# Patient Record
Sex: FEMALE | Race: ASIAN | NOT HISPANIC OR LATINO | Employment: FULL TIME | URBAN - METROPOLITAN AREA
[De-identification: names, ages, dates, MRNs, and addresses within clinical notes are randomized per-mention and may not be internally consistent; named-entity substitution may affect disease eponyms.]

---

## 2021-03-10 ENCOUNTER — INITIAL PRENATAL (OUTPATIENT)
Dept: OBSTETRICS AND GYNECOLOGY | Facility: CLINIC | Age: 26
End: 2021-03-10

## 2021-03-10 VITALS
SYSTOLIC BLOOD PRESSURE: 98 MMHG | DIASTOLIC BLOOD PRESSURE: 61 MMHG | WEIGHT: 110 LBS | BODY MASS INDEX: 20.24 KG/M2 | HEIGHT: 62 IN

## 2021-03-10 DIAGNOSIS — O21.9 NAUSEA AND VOMITING OF PREGNANCY, ANTEPARTUM: ICD-10-CM

## 2021-03-10 DIAGNOSIS — Z12.4 CERVICAL CANCER SCREENING: ICD-10-CM

## 2021-03-10 DIAGNOSIS — Z34.90 PREGNANCY, UNSPECIFIED GESTATIONAL AGE: Primary | ICD-10-CM

## 2021-03-10 LAB
B-HCG UR QL: POSITIVE
INTERNAL NEGATIVE CONTROL: NEGATIVE
INTERNAL POSITIVE CONTROL: POSITIVE
Lab: ABNORMAL

## 2021-03-10 PROCEDURE — 81025 URINE PREGNANCY TEST: CPT | Performed by: STUDENT IN AN ORGANIZED HEALTH CARE EDUCATION/TRAINING PROGRAM

## 2021-03-10 PROCEDURE — 0501F PRENATAL FLOW SHEET: CPT | Performed by: STUDENT IN AN ORGANIZED HEALTH CARE EDUCATION/TRAINING PROGRAM

## 2021-03-10 NOTE — PATIENT INSTRUCTIONS
Travel During Pregnancy:  • Always use seatbelts.  A lap belt should be worn below the abdomen (across the hips) and the shoulder belt should be worn across the center of your chest (between the breasts) away from your neck.  Do not put the shoulder belt under your arm or behind your back.  Pull any slack out of the belt.  • Air travel is safe in most uncomplicated pregnancies, but we do not recommend air travel past 36 weeks.  Airlines may also have restrictions, so check with your airline before flying.  For some international flights, the travel cut off may be as early as 28 weeks gestation, and some airlines may require letters from your physician.  • When going on long trips in car, plane, train, or bus, frequent ambulation is important to prevent blood clots in legs and/or lungs.  The following may help with prevention of blood clots in legs: Drink lots of fluid, wear loose-fitting clothing, walk and stretch at regular intervals.    • Avoid areas with Zika outbreaks.  For the latest information, you may visit: www.cdc.gov/travel/notices/.  Resources: , , Committee Opinion 455  Nutrition during pregnancy  • Average weight gain during pregnancy is based on your pre-pregnancy body mass index (BMI).  See below for recommended weight gain:  o Underweight (BMI <18.5), we recommend 28 to 40lb weight gain  o Normal weight (BMI 18.5 to 24.9), we recommend a 25 to 35lb weight gain  o Overweight (BMI 25-29.9), we recommend a 15 to 25lb weight gain  o Obese (BMI >30), we recommend keeping weight gain under 20 lbs.    • You should speak with your physician regarding your specific weight goals for this pregnancy.   • Foods to avoid include:   o Fish: avoid certain types of fish such as shark, swordfish, kip mackerel, tilefish.  Limit white (albacore) tuna to 6 oz per week.  Choose fish and shellfish such as shrimp, salmon, catfish, [place].  o Food-borne illness: Pregnant women are much more likely to get  Listeriosis than non-pregnant women.  To help prevent this, avoid eating unpasteurized milk and unpasteurized milk products, hot dogs/lunch meat/cold cuts unless they are heated until steaming right before serving, refrigerated meat spreads, refrigerated smoked seafood, raw or undercooked seafood/eggs/meat.   • Vitamin D helps development of the baby’s bones and teeth.  Good sources of Vitamin D include milk fortified with Vitamin D and fatty fish such as salmon.    • Folic acid, also known as folate, helps develop the baby’s brain and spine.  You should make sure your vitamin contains extra folic acid - at least 400mcg.    • Iron helps make red blood cells.  You need to make extra red blood cell sin pregnancy.  We recommend eating Iron-rich foods such as lean red meat, poultry, fish, dried beans and peas, iron-fortified cereals, and prune juice.  You may be recommended an iron supplement.  If so, it is absorbed more easily if taken with vitamin C-rich foods such as citrus fruits or tomatoes.    • It is important to eat a well balanced diet.  A good recourse for nutrition recommendations is: www.choosemyplate.gov.  • Limit caffeine intake to 200mg daily.  Some coffees/teas/sodas have very different levels of caffeine per serving, so check the nutrition labeling.   Resources: , Committee Opinion 548  Exercise during pregnancy  • If you are healthy and your pregnancy is normal, it is safe to continue or start most types of exercise.   Physical activity does not increase your risk of miscarriage, low birth weight or early delivery, but you should discuss specific limitations or any complications with your physician.    • Benefits of exercise during pregnancy include: reduced back pain, less constipation, promotes overall health and healthy weight gain which may decrease risks for certain pregnancy complications such as diabetes and/or preeclampsia.  • The CDC recommends 150 minutes of moderate intensity aerobic  exercise per week.  Moderate intensity means that you are moving enough to raise your heart rate and start sweating, but you can talk normally.  Brisk walking, swimming/water workouts, modified yoga/pilates, and use of elliptical machines and/or stationary bikes are examples of aerobic activity.    • Precautions:  o Stay hydrated.  Drink plenty of water before, during and after your workout  o Wear supportive clothing such as a sports bra  o Do not become overheated.  Do not exercise outside when it is very hot or humid  o Avoid lying flat on your back as much as possible  o AVOID contact sports, skydiving, sports that risk falling (such as skiing, surfing, off-road cycling, gymnastics, horseback riding), hot yoga/hot pilates, scuba diving  • Stop exercising if you experience: bleeding from the vagina, feeling dizzy/faint, shortness of breath before starting exercise, chest pain, headache, muscle weakness, calf pain or swelling, regular uterine contractions, fluid leaking from the vagina.    • Postpartum exercise: Continuing exercise after you deliver your baby will help boost your energy, strengthen muscles, promote better sleep, and relieve stress.  It also may be useful in preventing postpartum depression.  150 minutes of moderate-intensity aerobic activity is recommended.  Types of exercise and when you can start a regular exercise routine may be limited by the type of delivery you had.  Please discuss with your physician prior to resuming or starting exercise.    Resources: ,   Back pain during pregnancy  • Back pain is very common during pregnancy.  It may arise due to strain on your back muscles, weakness of the abdominal muscles, and pregnancy hormones.    • To prevent back pain:  o Wear shoes with good support. Flat shoes and high heels may not have good arch support.  o Consider a firm mattress  o Use good lifting practices.  Do not bend from the waist to pick things up.  Squat down and bend  "your knees, keeping a straight back.  o Sit in chairs with good back support or use a small pillow behind your lower back.    o Sleep on your side with one or two pillows between your legs  • To ease back pain:   o Exercise can help stretch strained muscles and strengthen weak muscles to promote good posture  • Contact your health care provider with severe pain, pain that persists for more than 2 weeks, fever, burning during urination, or vaginal bleeding.  Resources:     B6/Doxylamine  As a single agent, the recommended dose of pyridoxine (also known as B6) is 10 to 25 mg orally every six to eight hours; the maximum treatment dose suggested for pregnant women is 200 mg/day.  In the [place], doxylamine is available in some over-the-counter sleeping pills (eg, Unisom Sleep Tabs) and as a prescription antihistamine chewable tablet (Aldex AN). One-half of the 25 mg over-the-counter tablet or two chewable 5 mg tablets can be used off-label as an antiemetic; pyridoxine 25 mg three to four times per day should be prescribed with it. The 10 mg dose of pyridoxine is not commercially available in the [place].    Other Information:  The [employer] has released a smart phone application that can help you monitor and manage your symptoms.  The Application is \"Managing NVP,\" and has a green icon that says \"APGO WELLMOM.\"        "

## 2021-03-10 NOTE — PROGRESS NOTES
Initial OB Visit    Chief Complaint   Patient presents with   • Initial Prenatal Visit        Beata Damico is being seen today for her first obstetrical visit.  She is a 25 y.o.    6w6d gestation by LMP 21.     FOB: Jayson Myrick   This is a planned pregnancy.   OB History    Para Term  AB Living   1             SAB TAB Ectopic Molar Multiple Live Births                    # Outcome Date GA Lbr Salvador/2nd Weight Sex Delivery Anes PTL Lv   1 Current                Current obstetric complaints: She reports having morning sickness and feeling tired. Denies vaginal bleeding or abdominal cramping.   Prior obstetric issues, potential pregnancy concerns: n/a  Family history of genetic issues (includes FOB): denies   Prior infections concerning in pregnancy (Rash, fever since LMP): denies   Varicella Hx: unsure if she has receive vaccine.   Prior genetic testing: denies  History of abnormal pap smears: Yes, 4 years ago had an abnormal pap smear. She received HPV vaccine and then had a normal pap smear a year later.   History of STIs: denies History of HSV in self or partner? Denies   Prepregnancy weight: 110 lbs     Past Medical History:   Diagnosis Date   • Abnormal Pap smear of cervix    • Urinary tract infection        History reviewed. No pertinent surgical history.    No current outpatient medications on file.    No Known Allergies    Social History     Socioeconomic History   • Marital status:      Spouse name: Not on file   • Number of children: Not on file   • Years of education: Not on file   • Highest education level: Not on file   Tobacco Use   • Smoking status: Never Smoker   Substance and Sexual Activity   • Alcohol use: Not Currently   • Drug use: Not Currently   • Sexual activity: Yes       History reviewed. No pertinent family history.    Review of systems     Constitutional: negative for chills, fevers and positive for fatigue  Eyes: negative  Ears, nose, mouth, throat, and  face: negative for hearing loss and nasal congestion  Respiratory: negative for asthma and wheezing  Cardiovascular: negative for chest pain and dyspnea  Gastrointestinal: negative for dyspepsia, dysphagia abdominal pain  Genitourinary:negative for urinary incontinence  Integument/breast: negative for breast lump  Hematologic/lymphatic: negative for bleeding  Musculoskeletal:negative for aches  Neurological: negative for numbness/tingling  Behavioral/Psych: negative for anhedonia  Allergic/Immunologic: negative for rash, allergy     Objective    LMP 01/21/2021       General Appearance:    Alert, cooperative, in no acute distress   Head:    Normocephalic, without obvious abnormality, atraumatic   Eyes:            Lids and lashes normal, conjunctivae and sclerae normal, no   icterus, no pallor, corneas clear   Ears:    Ears appear intact with no abnormalities noted   Neck:   No adenopathy, supple, trachea midline   Back:     No kyphosis present, no scoliosis present,                       Lungs:     No increased work of breathing, regular respirations     Heart:    Regular rhythm and normal rate   Breast Exam:    No masses, No nipple discharge   Abdomen:     Normal bowel sounds, no masses, no organomegaly, soft        non-tender, non-distended, no guarding, no rebound                 tenderness   Genitalia:    Vulva - BUS-WNL, NEFG    Vagina - No discharge, No bleeding    Cervix - No Lesions, closed     Uterus - Consistent with 6 weeks    Adnexa - No masses, NT    Pelvimetry - clinically adequate, gynecoid pelvis     Extremities:   Moves all extremities well, no edema, no cyanosis, no              redness   Pulses:   Pulses palpable and equal bilaterally   Skin:   No bleeding, bruising or rash   Lymph nodes:   No palpable adenopathy   Neurologic:   Sensation intact, A&O times 3      Assessment  1. Pregnancy at 6w6d by LMP  2. Nausea and vomiting of pregnancy  3. Cervical cancer screening     Plan    Initial labs  drawn, GC/CHL screen done.  Dating ultrasound ordered today.   Pap smear collected for cervical cancer screening today.   Patient is on Prenatal vitamins  Problem list reviewed and updated.  Reviewed routine prenatal care with the office to include but not limited to:    not to changing cat litter, food restrictions, avoidance of alcohol, tobacco and drugs and saunas/hot tubs, anticipated weight gain/nutrition requirements.  Reviewed nature of practice and hospital.  Reviewed recommended follow up, importance of compliance with care. We reviewed testing in pregnancy including HIV testing and urine drug screen.    Reviewed aneuploidy screening and CF/SMA screening.  We reviewed limitations of testing, possibility of false positive/negative results, possible need for other tests as indicated. CF/SMA testing ordered today.   For contraception after this pregnancy, she is unsure at this time.   Counseled on limitations of ultrasound in pregnancy in detecting aneuploidy/fetal anomalies    We reviewed that at this time, her BMI is classified as BMI 18.5-24.9       Classification: normal weight.  We reviewed that in pregnancy, her recommended weight gain is 25-35 lbs.  In the first trimester, caloric demand is typically not increased.  In the second and third trimester, the increased demand is approximately 350 and 450 calories respectively.    We discussed that first line treatment of nausea and vomiting in pregnancy is vitamin B6/Unisom. Instructions for dosing providing in AVS. We discussed antiemetics can be used if she does not improve with first line therapy.     All questions answered.   Return in about 4 weeks (around 4/7/2021) for prenatal visit .      Paz Ortega MD

## 2021-03-11 LAB
AMPHETAMINES UR QL SCN: NEGATIVE NG/ML
BARBITURATES UR QL SCN: NEGATIVE NG/ML
BENZODIAZ UR QL SCN: NEGATIVE NG/ML
BZE UR QL SCN: NEGATIVE NG/ML
CANNABINOIDS UR QL SCN: NEGATIVE NG/ML
CREAT UR-MCNC: 25 MG/DL (ref 20–300)
LABORATORY COMMENT REPORT: NORMAL
METHADONE UR QL SCN: NEGATIVE NG/ML
OPIATES UR QL SCN: NEGATIVE NG/ML
OXYCODONE+OXYMORPHONE UR QL SCN: NEGATIVE NG/ML
PCP UR QL: NEGATIVE NG/ML
PH UR: 6.8 [PH] (ref 4.5–8.9)
PROPOXYPH UR QL SCN: NEGATIVE NG/ML
VZV IGG SER IA-ACNC: 137 INDEX

## 2021-03-12 LAB
BACTERIA UR CULT: NO GROWTH
BACTERIA UR CULT: NORMAL
C TRACH RRNA CVX QL NAA+PROBE: NEGATIVE
CONV .: NORMAL
CYTOLOGIST CVX/VAG CYTO: NORMAL
CYTOLOGY CVX/VAG DOC CYTO: NORMAL
CYTOLOGY CVX/VAG DOC THIN PREP: NORMAL
DX ICD CODE: NORMAL
HIV 1 & 2 AB SER-IMP: NORMAL
N GONORRHOEA RRNA CVX QL NAA+PROBE: NEGATIVE
OTHER STN SPEC: NORMAL
STAT OF ADQ CVX/VAG CYTO-IMP: NORMAL

## 2021-03-15 ENCOUNTER — TELEPHONE (OUTPATIENT)
Dept: OBSTETRICS AND GYNECOLOGY | Facility: CLINIC | Age: 26
End: 2021-03-15

## 2021-03-29 LAB
CLINICAL INFO: NORMAL
ETHNIC BACKGROUND STATED: NORMAL
GENE MUT TESTED BLD/T: NORMAL
GENETIC COUNSELOR:: NORMAL
LAB DIRECTOR NAME PROVIDER: NORMAL
MOL DX INTERP BLD/T QL: NORMAL
REASON FOR REFERRAL (NARRATIVE): NORMAL
REF LAB TEST METHOD: NORMAL
SERVICE CMNT 02-IMP: NORMAL
SPECIMEN SOURCE: NORMAL
TEST PERFORMANCE INFO SPEC: NORMAL

## 2021-04-07 ENCOUNTER — ROUTINE PRENATAL (OUTPATIENT)
Dept: OBSTETRICS AND GYNECOLOGY | Facility: CLINIC | Age: 26
End: 2021-04-07

## 2021-04-07 VITALS — WEIGHT: 111 LBS | DIASTOLIC BLOOD PRESSURE: 64 MMHG | SYSTOLIC BLOOD PRESSURE: 105 MMHG | BODY MASS INDEX: 20.3 KG/M2

## 2021-04-07 DIAGNOSIS — O21.9 NAUSEA AND VOMITING OF PREGNANCY, ANTEPARTUM: ICD-10-CM

## 2021-04-07 DIAGNOSIS — Z28.39 MATERNAL VARICELLA, NON-IMMUNE: ICD-10-CM

## 2021-04-07 DIAGNOSIS — O09.899 MATERNAL VARICELLA, NON-IMMUNE: ICD-10-CM

## 2021-04-07 DIAGNOSIS — Z3A.10 10 WEEKS GESTATION OF PREGNANCY: Primary | ICD-10-CM

## 2021-04-07 LAB
GLUCOSE UR STRIP-MCNC: NEGATIVE MG/DL
PROT UR STRIP-MCNC: ABNORMAL MG/DL

## 2021-04-07 PROCEDURE — 0502F SUBSEQUENT PRENATAL CARE: CPT | Performed by: STUDENT IN AN ORGANIZED HEALTH CARE EDUCATION/TRAINING PROGRAM

## 2021-04-07 NOTE — PROGRESS NOTES
Chief Complaint   Patient presents with   • Routine Prenatal Visit      Beata Damico is a 26 y.o.  at 10w6d who presents for routine prenatal visit. She reports that she still has nausea and vomiting depending on what she eats and how much she eats. She is able to tolerate PO. She denies vaginal bleeding or abdominal cramping.   /64   Wt 50.3 kg (111 lb)   LMP 2021   BMI 20.30 kg/m²    Gen: well appearing, NAD   Abd: gravid, nontender  See OB Flowsheet    ASSESSMENT:   1. IUP at 10w6d   2. Nausea and vomiting of pregnancy- mild and manageable without medication   3. Varicella non-immune     PLAN:  See updated Problem List   We reviewed aneuploidy screening with cell free DNA via [id number] testing and the patient would like to wait at this time. Reviewed possibility of false positive/negatives associated with results.  Reviewed varicella non-immune status and recommended vaccination after pregnancy. Avoidance of individual with chicken pox or shingles.  Patient to complete OB panel with HIV today.  First trimester bleeding/pain precautions reviewed.  Return in about 4 weeks (around 2021) for prenatal visit .    Paz Ortega MD

## 2021-04-16 ENCOUNTER — BULK ORDERING (OUTPATIENT)
Dept: CASE MANAGEMENT | Facility: OTHER | Age: 26
End: 2021-04-16

## 2021-04-16 DIAGNOSIS — Z23 IMMUNIZATION DUE: ICD-10-CM

## 2021-05-04 ENCOUNTER — ROUTINE PRENATAL (OUTPATIENT)
Dept: OBSTETRICS AND GYNECOLOGY | Facility: CLINIC | Age: 26
End: 2021-05-04

## 2021-05-04 VITALS — BODY MASS INDEX: 20.85 KG/M2 | DIASTOLIC BLOOD PRESSURE: 57 MMHG | SYSTOLIC BLOOD PRESSURE: 110 MMHG | WEIGHT: 114 LBS

## 2021-05-04 DIAGNOSIS — Z34.00 SUPERVISION OF NORMAL FIRST PREGNANCY, ANTEPARTUM: ICD-10-CM

## 2021-05-04 DIAGNOSIS — Z3A.14 14 WEEKS GESTATION OF PREGNANCY: Primary | ICD-10-CM

## 2021-05-04 LAB
GLUCOSE UR STRIP-MCNC: NEGATIVE MG/DL
PROT UR STRIP-MCNC: NEGATIVE MG/DL

## 2021-05-04 PROCEDURE — 0502F SUBSEQUENT PRENATAL CARE: CPT | Performed by: STUDENT IN AN ORGANIZED HEALTH CARE EDUCATION/TRAINING PROGRAM

## 2021-05-04 NOTE — PROGRESS NOTES
Chief Complaint   Patient presents with   • Routine Prenatal Visit      Beata Damico is a 26 y.o.  at 14w5d who presents for routine prenatal visit. She reports doing well and has no complaints. She denies vaginal bleeding, cramping, contractions, LOF. She reports that she might have felt fetal movement but is unsure. Nausea and vomiting have significantly improved as pregnancy has progressed.   .   /57   Wt 51.7 kg (114 lb)   LMP 2021   BMI 20.85 kg/m²    Gen: well appearing, NAD   Abd: gravid, nontender  See OB Flowsheet    ASSESSMENT:   1. IUP at 14w5d   2. Varicella non-immune     PLAN:  Problem list reviewed and updated.  Declines aneuploidy screening at this time.   We discussed anatomy scan at next visit.   Second trimester precautions reviewed including  labor precautions, anticipated fetal movements.   Return in about 4 weeks (around 2021) for prenatal and anatomy ultrasound .    Patient Active Problem List    Diagnosis Date Noted   • Maternal varicella, non-immune 2021   • Pregnancy 03/10/2021       Orders Placed This Encounter   Procedures   • POC Urinalysis Dipstick     This is an external result entered through the Results Console.     Order Specific Question:   Release to patient     Answer:   Immediate     Paz Ortega MD

## 2021-06-01 ENCOUNTER — ROUTINE PRENATAL (OUTPATIENT)
Dept: OBSTETRICS AND GYNECOLOGY | Facility: CLINIC | Age: 26
End: 2021-06-01

## 2021-06-01 VITALS — BODY MASS INDEX: 21.58 KG/M2 | SYSTOLIC BLOOD PRESSURE: 102 MMHG | DIASTOLIC BLOOD PRESSURE: 64 MMHG | WEIGHT: 118 LBS

## 2021-06-01 DIAGNOSIS — Z3A.18 18 WEEKS GESTATION OF PREGNANCY: Primary | ICD-10-CM

## 2021-06-01 LAB
GLUCOSE UR STRIP-MCNC: NEGATIVE MG/DL
PROT UR STRIP-MCNC: NEGATIVE MG/DL

## 2021-06-01 PROCEDURE — 0502F SUBSEQUENT PRENATAL CARE: CPT | Performed by: STUDENT IN AN ORGANIZED HEALTH CARE EDUCATION/TRAINING PROGRAM

## 2021-06-01 NOTE — PROGRESS NOTES
CC: Routine prenatal visit     Beata Damico is a 26 y.o.  at 18w5d who presents for routine prenatal visit. She reports doing well and is starting to feel better. Denies vaginal bleeding, cramping, contractions, LOF. She is unsure if she has felt fetal movement. She reports that her maternal grandmother  during childbirth of her mother.     /64   Wt 53.5 kg (118 lb)   LMP 2021   BMI 21.58 kg/m²    Gen: well appearing, NAD   Abd: gravid, nontender  See OB Flowsheet    ASSESSMENT:   1. IUP at 18w5d     PLAN:  Problem list reviewed and updated.   Reviewed anatomy ultrasound that demonstrated normal anatomy with exception of suboptimal 3 VV view. Will plan to repeat in 4 weeks.   Second trimester precautions reviewed including  labor precautions, anticipated fetal movements.   AFP reviewed, declines.  Return in about 4 weeks (around 2021) for prenatal visit and follow up ultrasound .    Patient Active Problem List    Diagnosis Date Noted   • Maternal varicella, non-immune 2021   • Pregnancy 03/10/2021       Orders Placed This Encounter   Procedures   • POC Urinalysis Dipstick     This is an external result entered through the Results Console.     Order Specific Question:   Release to patient     Answer:   Immediate     Paz Ortega MD

## 2021-06-29 ENCOUNTER — ROUTINE PRENATAL (OUTPATIENT)
Dept: OBSTETRICS AND GYNECOLOGY | Facility: CLINIC | Age: 26
End: 2021-06-29

## 2021-06-29 VITALS — WEIGHT: 126 LBS | SYSTOLIC BLOOD PRESSURE: 105 MMHG | DIASTOLIC BLOOD PRESSURE: 60 MMHG | BODY MASS INDEX: 23.05 KG/M2

## 2021-06-29 DIAGNOSIS — Z34.00 SUPERVISION OF NORMAL FIRST PREGNANCY, ANTEPARTUM: ICD-10-CM

## 2021-06-29 DIAGNOSIS — Z3A.22 22 WEEKS GESTATION OF PREGNANCY: Primary | ICD-10-CM

## 2021-06-29 LAB
GLUCOSE UR STRIP-MCNC: NEGATIVE MG/DL
PROT UR STRIP-MCNC: NEGATIVE MG/DL

## 2021-06-29 PROCEDURE — 0502F SUBSEQUENT PRENATAL CARE: CPT | Performed by: STUDENT IN AN ORGANIZED HEALTH CARE EDUCATION/TRAINING PROGRAM

## 2021-07-27 ENCOUNTER — ROUTINE PRENATAL (OUTPATIENT)
Dept: OBSTETRICS AND GYNECOLOGY | Facility: CLINIC | Age: 26
End: 2021-07-27

## 2021-07-27 VITALS — BODY MASS INDEX: 24.69 KG/M2 | DIASTOLIC BLOOD PRESSURE: 71 MMHG | SYSTOLIC BLOOD PRESSURE: 126 MMHG | WEIGHT: 135 LBS

## 2021-07-27 DIAGNOSIS — Z34.02 ENCOUNTER FOR SUPERVISION OF NORMAL FIRST PREGNANCY IN SECOND TRIMESTER: ICD-10-CM

## 2021-07-27 DIAGNOSIS — Z3A.26 26 WEEKS GESTATION OF PREGNANCY: Primary | ICD-10-CM

## 2021-07-27 LAB
GLUCOSE UR STRIP-MCNC: NEGATIVE MG/DL
PROT UR STRIP-MCNC: NEGATIVE MG/DL

## 2021-07-27 PROCEDURE — 0502F SUBSEQUENT PRENATAL CARE: CPT | Performed by: STUDENT IN AN ORGANIZED HEALTH CARE EDUCATION/TRAINING PROGRAM

## 2021-07-28 DIAGNOSIS — O99.019 MATERNAL ANEMIA IN PREGNANCY, ANTEPARTUM: Primary | ICD-10-CM

## 2021-07-28 RX ORDER — FERROUS SULFATE 325(65) MG
325 TABLET ORAL
Qty: 30 TABLET | Refills: 5 | Status: SHIPPED | OUTPATIENT
Start: 2021-07-28 | End: 2021-09-07 | Stop reason: SDUPTHER

## 2021-08-24 ENCOUNTER — ROUTINE PRENATAL (OUTPATIENT)
Dept: OBSTETRICS AND GYNECOLOGY | Facility: CLINIC | Age: 26
End: 2021-08-24

## 2021-08-24 VITALS — WEIGHT: 144 LBS | SYSTOLIC BLOOD PRESSURE: 100 MMHG | BODY MASS INDEX: 26.34 KG/M2 | DIASTOLIC BLOOD PRESSURE: 66 MMHG

## 2021-08-24 DIAGNOSIS — Z34.03 ENCOUNTER FOR SUPERVISION OF NORMAL FIRST PREGNANCY IN THIRD TRIMESTER: ICD-10-CM

## 2021-08-24 DIAGNOSIS — Z3A.30 30 WEEKS GESTATION OF PREGNANCY: Primary | ICD-10-CM

## 2021-08-24 LAB
GLUCOSE UR STRIP-MCNC: NEGATIVE MG/DL
PROT UR STRIP-MCNC: NEGATIVE MG/DL

## 2021-08-24 PROCEDURE — 0502F SUBSEQUENT PRENATAL CARE: CPT | Performed by: STUDENT IN AN ORGANIZED HEALTH CARE EDUCATION/TRAINING PROGRAM

## 2021-09-07 ENCOUNTER — ROUTINE PRENATAL (OUTPATIENT)
Dept: OBSTETRICS AND GYNECOLOGY | Facility: CLINIC | Age: 26
End: 2021-09-07

## 2021-09-07 VITALS — DIASTOLIC BLOOD PRESSURE: 67 MMHG | BODY MASS INDEX: 26.34 KG/M2 | WEIGHT: 144 LBS | SYSTOLIC BLOOD PRESSURE: 110 MMHG

## 2021-09-07 DIAGNOSIS — Z3A.32 32 WEEKS GESTATION OF PREGNANCY: Primary | ICD-10-CM

## 2021-09-07 DIAGNOSIS — G56.00 CARPAL TUNNEL SYNDROME DURING PREGNANCY: ICD-10-CM

## 2021-09-07 DIAGNOSIS — O99.019 MATERNAL ANEMIA IN PREGNANCY, ANTEPARTUM: ICD-10-CM

## 2021-09-07 DIAGNOSIS — O26.899 CARPAL TUNNEL SYNDROME DURING PREGNANCY: ICD-10-CM

## 2021-09-07 DIAGNOSIS — Z34.03 ENCOUNTER FOR SUPERVISION OF NORMAL FIRST PREGNANCY IN THIRD TRIMESTER: ICD-10-CM

## 2021-09-07 LAB
GLUCOSE UR STRIP-MCNC: NEGATIVE MG/DL
PROT UR STRIP-MCNC: NEGATIVE MG/DL

## 2021-09-07 PROCEDURE — 0502F SUBSEQUENT PRENATAL CARE: CPT | Performed by: STUDENT IN AN ORGANIZED HEALTH CARE EDUCATION/TRAINING PROGRAM

## 2021-09-07 RX ORDER — FERROUS SULFATE 325(65) MG
325 TABLET ORAL
Qty: 30 TABLET | Refills: 5 | Status: SHIPPED | OUTPATIENT
Start: 2021-09-07 | End: 2021-10-06 | Stop reason: SDUPTHER

## 2021-09-07 RX ORDER — ARM BRACE
1 EACH MISCELLANEOUS NIGHTLY
Qty: 2 EACH | Refills: 0 | Status: SHIPPED | OUTPATIENT
Start: 2021-09-07 | End: 2021-12-14

## 2021-09-07 NOTE — PROGRESS NOTES
Chief Complaint   Patient presents with   • Routine Prenatal Visit      Beata Damico is a 26 y.o.  at 32w5d who presents for routine prenatal visit. She reports that she is experiencing numbness in her dominant right hand throughout the day and at night. She denies vaginal bleeding, cramping, contractions, LOF. She reports active fetal movement.     /67   Wt 65.3 kg (144 lb)   LMP 2021   BMI 26.34 kg/m²    Gen: well appearing, NAD   Abd: gravid, nontender  See OB Flowsheet    ASSESSMENT:   1. IUP at 32w5d   2. Encounter for supervision of normal first pregnancy  3. Carpal tunnel of right wrist   4. Maternal anemia- Hgb 9.9 and Hct 30.5% on 21     PLAN:  Problem list reviewed and updated.   Prescribed wrist brace to help with carpal tunnel syndrome and if it worsens will refer to orthopedics for possible steroid injection.   Renewed prescription for Fe supplementation and recommended every other day use to reduce risk of constipation.   Reviewed expectations of this stage of pregnancy.  Third trimester precautions reviewed including  labor signs, monitoring fetal movements  Return in about 2 weeks (around 2021) for prenatal visit .    Patient Active Problem List    Diagnosis Date Noted   • Maternal varicella, non-immune 2021   • Pregnancy 03/10/2021       Orders Placed This Encounter   Procedures   • POC Urinalysis Dipstick     This is an external result entered through the Results Console.     Order Specific Question:   Release to patient     Answer:   Immediate     Paz Ortega MD

## 2021-09-21 ENCOUNTER — ROUTINE PRENATAL (OUTPATIENT)
Dept: OBSTETRICS AND GYNECOLOGY | Facility: CLINIC | Age: 26
End: 2021-09-21

## 2021-09-21 VITALS — WEIGHT: 149 LBS | BODY MASS INDEX: 27.25 KG/M2 | DIASTOLIC BLOOD PRESSURE: 65 MMHG | SYSTOLIC BLOOD PRESSURE: 117 MMHG

## 2021-09-21 DIAGNOSIS — O99.019 MATERNAL ANEMIA IN PREGNANCY, ANTEPARTUM: ICD-10-CM

## 2021-09-21 DIAGNOSIS — Z3A.34 34 WEEKS GESTATION OF PREGNANCY: Primary | ICD-10-CM

## 2021-09-21 DIAGNOSIS — Z34.03 ENCOUNTER FOR SUPERVISION OF NORMAL FIRST PREGNANCY IN THIRD TRIMESTER: ICD-10-CM

## 2021-09-21 LAB
GLUCOSE UR STRIP-MCNC: NEGATIVE MG/DL
PROT UR STRIP-MCNC: NEGATIVE MG/DL

## 2021-09-21 PROCEDURE — 0502F SUBSEQUENT PRENATAL CARE: CPT | Performed by: STUDENT IN AN ORGANIZED HEALTH CARE EDUCATION/TRAINING PROGRAM

## 2021-10-05 ENCOUNTER — ROUTINE PRENATAL (OUTPATIENT)
Dept: OBSTETRICS AND GYNECOLOGY | Facility: CLINIC | Age: 26
End: 2021-10-05

## 2021-10-05 VITALS — WEIGHT: 152 LBS | SYSTOLIC BLOOD PRESSURE: 109 MMHG | BODY MASS INDEX: 27.8 KG/M2 | DIASTOLIC BLOOD PRESSURE: 71 MMHG

## 2021-10-05 DIAGNOSIS — O99.019 MATERNAL ANEMIA IN PREGNANCY, ANTEPARTUM: ICD-10-CM

## 2021-10-05 DIAGNOSIS — Z34.03 ENCOUNTER FOR SUPERVISION OF NORMAL FIRST PREGNANCY IN THIRD TRIMESTER: ICD-10-CM

## 2021-10-05 DIAGNOSIS — Z3A.36 36 WEEKS GESTATION OF PREGNANCY: Primary | ICD-10-CM

## 2021-10-05 LAB
GLUCOSE UR STRIP-MCNC: NEGATIVE MG/DL
PROT UR STRIP-MCNC: NEGATIVE MG/DL

## 2021-10-05 PROCEDURE — 0502F SUBSEQUENT PRENATAL CARE: CPT | Performed by: STUDENT IN AN ORGANIZED HEALTH CARE EDUCATION/TRAINING PROGRAM

## 2021-10-05 NOTE — PROGRESS NOTES
Chief Complaint   Patient presents with   • Routine Prenatal Visit      Beata Damico is a 26 y.o.  at 36w5d who presents for routine prenatal visit. She has no complaints today and reports compliance with iron pills. Denies vaginal bleeding, cramping, contractions, LOF. She reports active fetal movement.     /71   Wt 68.9 kg (152 lb)   LMP 2021   BMI 27.80 kg/m²    Gen: well appearing, NAD   Abd: gravid, nontender  SVE: 0/3  See OB Flowsheet    ASSESSMENT:   1. IUP at 36w5d   2. Encounter for supervision of normal first pregnancy   3. Maternal anemia- on Fe supplementation     PLAN:  Problem list reviewed and updated.   GBS swab collected today and will notify patient of results and need for GBS prophylaxis if needed.  CBC ordered today to evaluate if anemia has improved with Fe supplementation. Will notify patient of results.   Reviewed expectations for this stage of pregnancy.   Third trimester precautions reviewed including labor signs, monitoring fetal movements.   Return in about 1 week (around 10/12/2021) for prenatal visit .    Patient Active Problem List    Diagnosis Date Noted   • Maternal varicella, non-immune 2021   • Pregnancy 03/10/2021       Orders Placed This Encounter   Procedures   • Group B Streptococcus Culture - Swab, Vaginal/Rectum     Order Specific Question:   Release to patient     Answer:   Immediate   • CBC (No Diff)     Order Specific Question:   Release to patient     Answer:   Immediate   • POC Urinalysis Dipstick     This is an external result entered through the Results Console.     Order Specific Question:   Release to patient     Answer:   Immediate     Paz Ortega MD

## 2021-10-06 LAB
ERYTHROCYTE [DISTWIDTH] IN BLOOD BY AUTOMATED COUNT: 15.3 % (ref 12.3–15.4)
HCT VFR BLD AUTO: 37.7 % (ref 34–46.6)
HGB BLD-MCNC: 12.5 G/DL (ref 12–15.9)
MCH RBC QN AUTO: 30.9 PG (ref 26.6–33)
MCHC RBC AUTO-ENTMCNC: 33.2 G/DL (ref 31.5–35.7)
MCV RBC AUTO: 93.3 FL (ref 79–97)
PLATELET # BLD AUTO: 191 10*3/MM3 (ref 140–450)
RBC # BLD AUTO: 4.04 10*6/MM3 (ref 3.77–5.28)
WBC # BLD AUTO: 6.52 10*3/MM3 (ref 3.4–10.8)

## 2021-10-06 RX ORDER — FERROUS SULFATE 325(65) MG
325 TABLET ORAL
Qty: 30 TABLET | Refills: 5 | Status: SHIPPED | OUTPATIENT
Start: 2021-10-06 | End: 2021-12-14

## 2021-10-09 LAB — B-HEM STREP SPEC QL CULT: NEGATIVE

## 2021-10-12 ENCOUNTER — ROUTINE PRENATAL (OUTPATIENT)
Dept: OBSTETRICS AND GYNECOLOGY | Facility: CLINIC | Age: 26
End: 2021-10-12

## 2021-10-12 VITALS — DIASTOLIC BLOOD PRESSURE: 72 MMHG | SYSTOLIC BLOOD PRESSURE: 115 MMHG | WEIGHT: 154 LBS | BODY MASS INDEX: 28.17 KG/M2

## 2021-10-12 DIAGNOSIS — Z3A.37 37 WEEKS GESTATION OF PREGNANCY: Primary | ICD-10-CM

## 2021-10-12 DIAGNOSIS — Z34.03 ENCOUNTER FOR SUPERVISION OF NORMAL FIRST PREGNANCY IN THIRD TRIMESTER: ICD-10-CM

## 2021-10-12 DIAGNOSIS — O99.019 MATERNAL ANEMIA IN PREGNANCY, ANTEPARTUM: ICD-10-CM

## 2021-10-12 LAB
GLUCOSE UR STRIP-MCNC: NEGATIVE MG/DL
GLUCOSE UR STRIP-MCNC: NEGATIVE MG/DL
PROT UR STRIP-MCNC: NEGATIVE MG/DL

## 2021-10-12 PROCEDURE — 0502F SUBSEQUENT PRENATAL CARE: CPT | Performed by: STUDENT IN AN ORGANIZED HEALTH CARE EDUCATION/TRAINING PROGRAM

## 2021-10-12 NOTE — PROGRESS NOTES
Chief Complaint   Patient presents with   • Routine Prenatal Visit      Beata Damico is a 26 y.o.  at 37w5d who presents for routine prenatal visit. She denies vaginal bleeding, cramping, contractions, LOF. She reports active fetal movement.     /72   Wt 69.9 kg (154 lb)   LMP 2021   BMI 28.17 kg/m²    Gen: well appearing, NAD   Abd: gravid, nontender  SVE: deferred   See OB Flowsheet    ASSESSMENT:   1. IUP at 37w5d   2. Encounter for supervision of normal first pregnancy  3. Maternal anemia- on Fe supplementation     PLAN:  Problem list reviewed and updated.   Reviewed negative GBS swab.  Recommended Tdap, Flu, and COVID-19 vaccines but patient declines at this time.   Discussed expectations of this stage of pregnancy.   Third trimester precautions reviewed including labor signs, monitoring fetal movements.   Return in about 1 week (around 10/19/2021) for prenatal visit .    Patient Active Problem List    Diagnosis Date Noted   • Maternal varicella, non-immune 2021   • Pregnancy 03/10/2021       Orders Placed This Encounter   Procedures   • POC Urinalysis Dipstick     This is an external result entered through the Results Console.     Order Specific Question:   Release to patient     Answer:   Immediate   • POC Urinalysis Dipstick     This is an external result entered through the Results Console.     Order Specific Question:   Release to patient     Answer:   Immediate     Paz Ortega MD

## 2021-10-18 ENCOUNTER — TELEPHONE (OUTPATIENT)
Dept: OBSTETRICS AND GYNECOLOGY | Facility: CLINIC | Age: 26
End: 2021-10-18

## 2021-10-18 NOTE — TELEPHONE ENCOUNTER
Patient is aware and has no additional questions at this time.    Original message from provider-Please call and let her know that her GBS swab returned negative and she does not need antibiotics during labor. Thanks!

## 2021-10-19 ENCOUNTER — ROUTINE PRENATAL (OUTPATIENT)
Dept: OBSTETRICS AND GYNECOLOGY | Facility: CLINIC | Age: 26
End: 2021-10-19

## 2021-10-19 VITALS — WEIGHT: 153 LBS | SYSTOLIC BLOOD PRESSURE: 122 MMHG | DIASTOLIC BLOOD PRESSURE: 72 MMHG | BODY MASS INDEX: 27.98 KG/M2

## 2021-10-19 DIAGNOSIS — Z3A.38 38 WEEKS GESTATION OF PREGNANCY: Primary | ICD-10-CM

## 2021-10-19 DIAGNOSIS — O99.019 MATERNAL ANEMIA IN PREGNANCY, ANTEPARTUM: ICD-10-CM

## 2021-10-19 DIAGNOSIS — Z34.03 SUPERVISION OF NORMAL FIRST PREGNANCY IN THIRD TRIMESTER: ICD-10-CM

## 2021-10-19 PROCEDURE — 0502F SUBSEQUENT PRENATAL CARE: CPT | Performed by: STUDENT IN AN ORGANIZED HEALTH CARE EDUCATION/TRAINING PROGRAM

## 2021-10-19 NOTE — PROGRESS NOTES
Chief Complaint   Patient presents with   • Routine Prenatal Visit      Beata Damico is a 26 y.o.  at 38w5d who presents for routine prenatal visit. She reports doing well and has had some abdominal cramping. Denies vaginal bleeding, contractions, LOF. She reports active fetal movement.     /72   Wt 69.4 kg (153 lb)   LMP 2021   BMI 27.98 kg/m²    Gen: well appearing, NAD   Abd: gravid, nontender  SVE: 1.5/60/-2   See OB Flowsheet    ASSESSMENT:   1. IUP at 38w5d   2. Supervision of normal first pregnancy   3. Maternal anemia- on Fe supplementation  4. Varicella non-immune- will need vaccination postpartum      PLAN:  Problem list reviewed and updated.   We discussed if the patient would like elective induction of labor after 39 weeks vs await spontaneously labor and she would like induced if she goes past her due date. Will plan for IOL on 10/31/21 with likely cervical ripening based on next exam and will arrange at next appointment.   Recommended Tdap, Flu, and COVID-19 vaccines and patient declined at this time.   Reviewed expectations for this stage of pregnancy.   Third trimester precautions reviewed including labor signs, monitoring fetal movements.   Return in about 1 week (around 10/26/2021) for prenatal visit and growth ultrasound .    Patient Active Problem List    Diagnosis Date Noted   • Maternal varicella, non-immune 2021   • Pregnancy 03/10/2021       Orders Placed This Encounter   Procedures   • US ob follow up transabdominal approach     Order Specific Question:   Reason for Exam:     Answer:   growth ultrasound- maternal anemia     Order Specific Question:   Release to patient     Answer:   Immediate   • POC Urinalysis Dipstick     This is an external result entered through the Results Console.     Order Specific Question:   Release to patient     Answer:   Immediate   • POC Urinalysis Dipstick     This is an external result entered through the Results Console.      Order Specific Question:   Release to patient     Answer:   Immediate     Paz Ortega MD

## 2021-10-25 ENCOUNTER — ANESTHESIA (OUTPATIENT)
Dept: LABOR AND DELIVERY | Facility: HOSPITAL | Age: 26
End: 2021-10-25

## 2021-10-25 ENCOUNTER — HOSPITAL ENCOUNTER (INPATIENT)
Facility: HOSPITAL | Age: 26
LOS: 4 days | Discharge: HOME | End: 2021-10-29
Attending: STUDENT IN AN ORGANIZED HEALTH CARE EDUCATION/TRAINING PROGRAM | Admitting: STUDENT IN AN ORGANIZED HEALTH CARE EDUCATION/TRAINING PROGRAM

## 2021-10-25 ENCOUNTER — ANESTHESIA EVENT (OUTPATIENT)
Dept: LABOR AND DELIVERY | Facility: HOSPITAL | Age: 26
End: 2021-10-25

## 2021-10-25 DIAGNOSIS — Z98.891 S/P C-SECTION: Primary | ICD-10-CM

## 2021-10-25 LAB
ABO GROUP BLD: NORMAL
APTT PPP: 30.6 SECONDS (ref 22.7–35.4)
BASOPHILS # BLD AUTO: 0.04 10*3/MM3 (ref 0–0.2)
BASOPHILS NFR BLD AUTO: 0.5 % (ref 0–1.5)
BLD GP AB SCN SERPL QL: NEGATIVE
DEPRECATED RDW RBC AUTO: 56.7 FL (ref 37–54)
DEPRECATED RDW RBC AUTO: 57.2 FL (ref 37–54)
EOSINOPHIL # BLD AUTO: 0.04 10*3/MM3 (ref 0–0.4)
EOSINOPHIL NFR BLD AUTO: 0.5 % (ref 0.3–6.2)
ERYTHROCYTE [DISTWIDTH] IN BLOOD BY AUTOMATED COUNT: 15.5 % (ref 12.3–15.4)
ERYTHROCYTE [DISTWIDTH] IN BLOOD BY AUTOMATED COUNT: 15.6 % (ref 12.3–15.4)
FIBRINOGEN PPP-MCNC: 513 MG/DL (ref 219–464)
HCT VFR BLD AUTO: 41.3 % (ref 34–46.6)
HCT VFR BLD AUTO: 42.3 % (ref 34–46.6)
HGB BLD-MCNC: 13 G/DL (ref 12–15.9)
HGB BLD-MCNC: 13.4 G/DL (ref 12–15.9)
IMM GRANULOCYTES # BLD AUTO: 0.04 10*3/MM3 (ref 0–0.05)
IMM GRANULOCYTES NFR BLD AUTO: 0.5 % (ref 0–0.5)
INR PPP: 0.92 (ref 0.9–1.1)
LYMPHOCYTES # BLD AUTO: 1.55 10*3/MM3 (ref 0.7–3.1)
LYMPHOCYTES NFR BLD AUTO: 19.5 % (ref 19.6–45.3)
MCH RBC QN AUTO: 30.4 PG (ref 26.6–33)
MCH RBC QN AUTO: 30.8 PG (ref 26.6–33)
MCHC RBC AUTO-ENTMCNC: 31.5 G/DL (ref 31.5–35.7)
MCHC RBC AUTO-ENTMCNC: 31.7 G/DL (ref 31.5–35.7)
MCV RBC AUTO: 96.7 FL (ref 79–97)
MCV RBC AUTO: 97.2 FL (ref 79–97)
MONOCYTES # BLD AUTO: 0.66 10*3/MM3 (ref 0.1–0.9)
MONOCYTES NFR BLD AUTO: 8.3 % (ref 5–12)
NEUTROPHILS NFR BLD AUTO: 5.63 10*3/MM3 (ref 1.7–7)
NEUTROPHILS NFR BLD AUTO: 70.7 % (ref 42.7–76)
NRBC BLD AUTO-RTO: 0 /100 WBC (ref 0–0.2)
PLATELET # BLD AUTO: 166 10*3/MM3 (ref 140–450)
PLATELET # BLD AUTO: 173 10*3/MM3 (ref 140–450)
PMV BLD AUTO: 10.4 FL (ref 6–12)
PMV BLD AUTO: 10.8 FL (ref 6–12)
PROTHROMBIN TIME: 12.2 SECONDS (ref 11.7–14.2)
RBC # BLD AUTO: 4.27 10*6/MM3 (ref 3.77–5.28)
RBC # BLD AUTO: 4.35 10*6/MM3 (ref 3.77–5.28)
RH BLD: POSITIVE
SARS-COV-2 RNA PNL SPEC NAA+PROBE: NOT DETECTED
T&S EXPIRATION DATE: NORMAL
WBC # BLD AUTO: 6.88 10*3/MM3 (ref 3.4–10.8)
WBC # BLD AUTO: 7.96 10*3/MM3 (ref 3.4–10.8)

## 2021-10-25 PROCEDURE — 99202 OFFICE O/P NEW SF 15 MIN: CPT | Performed by: OBSTETRICS & GYNECOLOGY

## 2021-10-25 PROCEDURE — 25010000002 FENTANYL CITRATE (PF) 2500 MCG/50ML SOLUTION: Performed by: STUDENT IN AN ORGANIZED HEALTH CARE EDUCATION/TRAINING PROGRAM

## 2021-10-25 PROCEDURE — 85730 THROMBOPLASTIN TIME PARTIAL: CPT | Performed by: STUDENT IN AN ORGANIZED HEALTH CARE EDUCATION/TRAINING PROGRAM

## 2021-10-25 PROCEDURE — C1755 CATHETER, INTRASPINAL: HCPCS

## 2021-10-25 PROCEDURE — 86901 BLOOD TYPING SEROLOGIC RH(D): CPT | Performed by: STUDENT IN AN ORGANIZED HEALTH CARE EDUCATION/TRAINING PROGRAM

## 2021-10-25 PROCEDURE — 85025 COMPLETE CBC W/AUTO DIFF WBC: CPT | Performed by: STUDENT IN AN ORGANIZED HEALTH CARE EDUCATION/TRAINING PROGRAM

## 2021-10-25 PROCEDURE — 25010000002 ROPIVACAINE PER 1 MG: Performed by: STUDENT IN AN ORGANIZED HEALTH CARE EDUCATION/TRAINING PROGRAM

## 2021-10-25 PROCEDURE — C1755 CATHETER, INTRASPINAL: HCPCS | Performed by: ANESTHESIOLOGY

## 2021-10-25 PROCEDURE — 86900 BLOOD TYPING SEROLOGIC ABO: CPT | Performed by: STUDENT IN AN ORGANIZED HEALTH CARE EDUCATION/TRAINING PROGRAM

## 2021-10-25 PROCEDURE — S0260 H&P FOR SURGERY: HCPCS | Performed by: STUDENT IN AN ORGANIZED HEALTH CARE EDUCATION/TRAINING PROGRAM

## 2021-10-25 PROCEDURE — 87635 SARS-COV-2 COVID-19 AMP PRB: CPT | Performed by: STUDENT IN AN ORGANIZED HEALTH CARE EDUCATION/TRAINING PROGRAM

## 2021-10-25 PROCEDURE — 85027 COMPLETE CBC AUTOMATED: CPT | Performed by: STUDENT IN AN ORGANIZED HEALTH CARE EDUCATION/TRAINING PROGRAM

## 2021-10-25 PROCEDURE — 25010000002 GENTAMICIN PER 80 MG: Performed by: STUDENT IN AN ORGANIZED HEALTH CARE EDUCATION/TRAINING PROGRAM

## 2021-10-25 PROCEDURE — 25010000002 ONDANSETRON PER 1 MG: Performed by: STUDENT IN AN ORGANIZED HEALTH CARE EDUCATION/TRAINING PROGRAM

## 2021-10-25 PROCEDURE — 86850 RBC ANTIBODY SCREEN: CPT | Performed by: STUDENT IN AN ORGANIZED HEALTH CARE EDUCATION/TRAINING PROGRAM

## 2021-10-25 PROCEDURE — 25010000002 AMPICILLIN PER 500 MG: Performed by: STUDENT IN AN ORGANIZED HEALTH CARE EDUCATION/TRAINING PROGRAM

## 2021-10-25 PROCEDURE — 85610 PROTHROMBIN TIME: CPT | Performed by: STUDENT IN AN ORGANIZED HEALTH CARE EDUCATION/TRAINING PROGRAM

## 2021-10-25 PROCEDURE — 85384 FIBRINOGEN ACTIVITY: CPT | Performed by: STUDENT IN AN ORGANIZED HEALTH CARE EDUCATION/TRAINING PROGRAM

## 2021-10-25 PROCEDURE — 10907ZC DRAINAGE OF AMNIOTIC FLUID, THERAPEUTIC FROM PRODUCTS OF CONCEPTION, VIA NATURAL OR ARTIFICIAL OPENING: ICD-10-PCS | Performed by: STUDENT IN AN ORGANIZED HEALTH CARE EDUCATION/TRAINING PROGRAM

## 2021-10-25 RX ORDER — PRENATAL VIT NO.126/IRON/FOLIC 28MG-0.8MG
1 TABLET ORAL DAILY
COMMUNITY

## 2021-10-25 RX ORDER — SODIUM CHLORIDE, SODIUM LACTATE, POTASSIUM CHLORIDE, CALCIUM CHLORIDE 600; 310; 30; 20 MG/100ML; MG/100ML; MG/100ML; MG/100ML
125 INJECTION, SOLUTION INTRAVENOUS CONTINUOUS
Status: DISCONTINUED | OUTPATIENT
Start: 2021-10-25 | End: 2021-10-26

## 2021-10-25 RX ORDER — METHYLERGONOVINE MALEATE 0.2 MG/ML
200 INJECTION INTRAVENOUS ONCE AS NEEDED
Status: COMPLETED | OUTPATIENT
Start: 2021-10-25 | End: 2021-10-26

## 2021-10-25 RX ORDER — EPHEDRINE SULFATE 50 MG/ML
5 INJECTION, SOLUTION INTRAVENOUS AS NEEDED
Status: DISCONTINUED | OUTPATIENT
Start: 2021-10-25 | End: 2021-10-26 | Stop reason: HOSPADM

## 2021-10-25 RX ORDER — ONDANSETRON 2 MG/ML
4 INJECTION INTRAMUSCULAR; INTRAVENOUS ONCE AS NEEDED
Status: COMPLETED | OUTPATIENT
Start: 2021-10-25 | End: 2021-10-25

## 2021-10-25 RX ORDER — CARBOPROST TROMETHAMINE 250 UG/ML
250 INJECTION, SOLUTION INTRAMUSCULAR
Status: DISCONTINUED | OUTPATIENT
Start: 2021-10-25 | End: 2021-10-26 | Stop reason: HOSPADM

## 2021-10-25 RX ORDER — OXYTOCIN-SODIUM CHLORIDE 0.9% IV SOLN 30 UNIT/500ML 30-0.9/5 UT/ML-%
125 SOLUTION INTRAVENOUS CONTINUOUS PRN
Status: DISCONTINUED | OUTPATIENT
Start: 2021-10-25 | End: 2021-10-26 | Stop reason: HOSPADM

## 2021-10-25 RX ORDER — MISOPROSTOL 200 UG/1
800 TABLET ORAL ONCE AS NEEDED
Status: COMPLETED | OUTPATIENT
Start: 2021-10-25 | End: 2021-10-26

## 2021-10-25 RX ORDER — MAGNESIUM CARB/ALUMINUM HYDROX 105-160MG
30 TABLET,CHEWABLE ORAL ONCE
Status: DISCONTINUED | OUTPATIENT
Start: 2021-10-25 | End: 2021-10-26 | Stop reason: HOSPADM

## 2021-10-25 RX ORDER — OXYTOCIN-SODIUM CHLORIDE 0.9% IV SOLN 30 UNIT/500ML 30-0.9/5 UT/ML-%
250 SOLUTION INTRAVENOUS CONTINUOUS PRN
Status: ACTIVE | OUTPATIENT
Start: 2021-10-25 | End: 2021-10-25

## 2021-10-25 RX ORDER — ACETAMINOPHEN 500 MG
1000 TABLET ORAL ONCE AS NEEDED
Status: COMPLETED | OUTPATIENT
Start: 2021-10-25 | End: 2021-10-25

## 2021-10-25 RX ORDER — ROPIVACAINE HYDROCHLORIDE 2 MG/ML
INJECTION, SOLUTION EPIDURAL; INFILTRATION; PERINEURAL AS NEEDED
Status: DISCONTINUED | OUTPATIENT
Start: 2021-10-25 | End: 2021-10-26 | Stop reason: SURG

## 2021-10-25 RX ORDER — SODIUM CHLORIDE 0.9 % (FLUSH) 0.9 %
10 SYRINGE (ML) INJECTION AS NEEDED
Status: DISCONTINUED | OUTPATIENT
Start: 2021-10-25 | End: 2021-10-26 | Stop reason: HOSPADM

## 2021-10-25 RX ORDER — DIPHENHYDRAMINE HYDROCHLORIDE 50 MG/ML
12.5 INJECTION INTRAMUSCULAR; INTRAVENOUS EVERY 8 HOURS PRN
Status: DISCONTINUED | OUTPATIENT
Start: 2021-10-25 | End: 2021-10-26 | Stop reason: HOSPADM

## 2021-10-25 RX ORDER — SODIUM CHLORIDE 0.9 % (FLUSH) 0.9 %
10 SYRINGE (ML) INJECTION EVERY 12 HOURS SCHEDULED
Status: DISCONTINUED | OUTPATIENT
Start: 2021-10-25 | End: 2021-10-26 | Stop reason: HOSPADM

## 2021-10-25 RX ORDER — FAMOTIDINE 10 MG/ML
20 INJECTION, SOLUTION INTRAVENOUS ONCE AS NEEDED
Status: COMPLETED | OUTPATIENT
Start: 2021-10-25 | End: 2021-10-25

## 2021-10-25 RX ORDER — OXYTOCIN-SODIUM CHLORIDE 0.9% IV SOLN 30 UNIT/500ML 30-0.9/5 UT/ML-%
2-20 SOLUTION INTRAVENOUS
Status: DISCONTINUED | OUTPATIENT
Start: 2021-10-25 | End: 2021-10-26 | Stop reason: HOSPADM

## 2021-10-25 RX ORDER — OXYTOCIN-SODIUM CHLORIDE 0.9% IV SOLN 30 UNIT/500ML 30-0.9/5 UT/ML-%
999 SOLUTION INTRAVENOUS ONCE
Status: COMPLETED | OUTPATIENT
Start: 2021-10-25 | End: 2021-10-26

## 2021-10-25 RX ADMIN — FAMOTIDINE 20 MG: 10 INJECTION, SOLUTION INTRAVENOUS at 20:56

## 2021-10-25 RX ADMIN — SODIUM CHLORIDE, POTASSIUM CHLORIDE, SODIUM LACTATE AND CALCIUM CHLORIDE 125 ML/HR: 600; 310; 30; 20 INJECTION, SOLUTION INTRAVENOUS at 12:17

## 2021-10-25 RX ADMIN — ROPIVACAINE HYDROCHLORIDE: 2 INJECTION, SOLUTION EPIDURAL; INFILTRATION at 19:15

## 2021-10-25 RX ADMIN — ACETAMINOPHEN 1000 MG: 500 TABLET, FILM COATED ORAL at 22:37

## 2021-10-25 RX ADMIN — AMPICILLIN 2 G: 2 INJECTION, POWDER, FOR SOLUTION INTRAMUSCULAR; INTRAVENOUS at 21:26

## 2021-10-25 RX ADMIN — ONDANSETRON 4 MG: 2 INJECTION INTRAMUSCULAR; INTRAVENOUS at 21:54

## 2021-10-25 RX ADMIN — ROPIVACAINE HYDROCHLORIDE 8 ML: 2 INJECTION, SOLUTION EPIDURAL; INFILTRATION at 11:42

## 2021-10-25 RX ADMIN — OXYTOCIN 2 MILLI-UNITS/MIN: 10 INJECTION INTRAVENOUS at 14:10

## 2021-10-25 RX ADMIN — ROPIVACAINE HYDROCHLORIDE 10 ML/HR: 2 INJECTION, SOLUTION EPIDURAL; INFILTRATION at 11:42

## 2021-10-25 RX ADMIN — GENTAMICIN SULFATE 250 MG: 40 INJECTION, SOLUTION INTRAMUSCULAR; INTRAVENOUS at 22:31

## 2021-10-25 RX ADMIN — SODIUM CHLORIDE, POTASSIUM CHLORIDE, SODIUM LACTATE AND CALCIUM CHLORIDE 125 ML/HR: 600; 310; 30; 20 INJECTION, SOLUTION INTRAVENOUS at 20:02

## 2021-10-25 NOTE — ANESTHESIA PROCEDURE NOTES
Labor Epidural      Patient reassessed immediately prior to procedure    Patient location during procedure: OB  Start Time: 10/25/2021 11:32 AM  Stop Time: 10/25/2021 11:41 AM  Indication:at surgeon's request  Performed By  Anesthesiologist: Neri Herndon MD  Preanesthetic Checklist  Completed: patient identified, IV checked, site marked, risks and benefits discussed, surgical consent, monitors and equipment checked, pre-op evaluation and timeout performed  Prep:  Pt Position:sitting  Sterile Tech:gloves, cap, sterile barrier and mask  Prep:chlorhexidine gluconate and isopropyl alcohol  Monitoring:continuous pulse oximetry, EKG and blood pressure monitoring  Epidural Block Procedure:  Approach:midline  Guidance:landmark technique and palpation technique  Location:L2-L3  Needle Type:Tuohy  Needle Gauge:17 G  Loss of Resistance Medium: saline  Loss of Resistance: 6cm  Cath Depth at skin:12 cm  Paresthesia: none  Aspiration:negative  Test Dose:negative  Number of Attempts: 1  Post Assessment:  Dressing:secured with tape and occlusive dressing applied  Pt Tolerance:patient tolerated the procedure well with no apparent complications  Complications:no

## 2021-10-25 NOTE — ANESTHESIA PREPROCEDURE EVALUATION
Anesthesia Evaluation     Patient summary reviewed and Nursing notes reviewed   no history of anesthetic complications:               Airway   Mallampati: II  TM distance: >3 FB  Neck ROM: full  No difficulty expected  Dental      Pulmonary    Cardiovascular   Exercise tolerance: good (4-7 METS)        Neuro/Psych  GI/Hepatic/Renal/Endo      Musculoskeletal     Abdominal    Substance History      OB/GYN    (+) Pregnant,         Other                        Anesthesia Plan    ASA 2     epidural   (39w4d)    Anesthetic plan, all risks, benefits, and alternatives have been provided, discussed and informed consent has been obtained with: patient.

## 2021-10-26 LAB
ATMOSPHERIC PRESS: 754.5 MMHG
BASE EXCESS BLDCOV CALC-SCNC: -5.7 MMOL/L (ref -30–30)
BDY SITE: ABNORMAL
COLLECT TME SMN: ABNORMAL
DEPRECATED RDW RBC AUTO: 56.4 FL (ref 37–54)
ERYTHROCYTE [DISTWIDTH] IN BLOOD BY AUTOMATED COUNT: 15.3 % (ref 12.3–15.4)
GAS FLOW AIRWAY: 2 LPM
HCO3 BLDCOV-SCNC: 20.3 MMOL/L
HCT VFR BLD AUTO: 32.1 % (ref 34–46.6)
HGB BLD-MCNC: 10.1 G/DL (ref 12–15.9)
MCH RBC QN AUTO: 31.2 PG (ref 26.6–33)
MCHC RBC AUTO-ENTMCNC: 31.5 G/DL (ref 31.5–35.7)
MCV RBC AUTO: 99.1 FL (ref 79–97)
MODALITY: ABNORMAL
NOTE: ABNORMAL
PCO2 BLDCOV: 40.5 MM HG (ref 35–51.3)
PH BLDCOV: 7.31 PH UNITS (ref 7.26–7.4)
PLATELET # BLD AUTO: 143 10*3/MM3 (ref 140–450)
PMV BLD AUTO: 10.7 FL (ref 6–12)
PO2 BLDCOV: 23.1 MM HG (ref 19–39)
RBC # BLD AUTO: 3.24 10*6/MM3 (ref 3.77–5.28)
SAO2 % BLDCOA: 35.1 % (ref 92–99)
SAO2 % BLDCOV: ABNORMAL %
WBC # BLD AUTO: 16.43 10*3/MM3 (ref 3.4–10.8)

## 2021-10-26 PROCEDURE — 0W3R0ZZ CONTROL BLEEDING IN GENITOURINARY TRACT, OPEN APPROACH: ICD-10-PCS | Performed by: STUDENT IN AN ORGANIZED HEALTH CARE EDUCATION/TRAINING PROGRAM

## 2021-10-26 PROCEDURE — 25010000002 AZITHROMYCIN PER 500 MG

## 2021-10-26 PROCEDURE — 25010000002 AZITHROMYCIN PER 500 MG: Performed by: STUDENT IN AN ORGANIZED HEALTH CARE EDUCATION/TRAINING PROGRAM

## 2021-10-26 PROCEDURE — 25010000002 ROPIVACAINE PER 1 MG: Performed by: STUDENT IN AN ORGANIZED HEALTH CARE EDUCATION/TRAINING PROGRAM

## 2021-10-26 PROCEDURE — 0 CEFAZOLIN IN DEXTROSE 2-4 GM/100ML-% SOLUTION: Performed by: STUDENT IN AN ORGANIZED HEALTH CARE EDUCATION/TRAINING PROGRAM

## 2021-10-26 PROCEDURE — 25010000002 MORPHINE PER 10 MG: Performed by: NURSE ANESTHETIST, CERTIFIED REGISTERED

## 2021-10-26 PROCEDURE — 59510 CESAREAN DELIVERY: CPT | Performed by: STUDENT IN AN ORGANIZED HEALTH CARE EDUCATION/TRAINING PROGRAM

## 2021-10-26 PROCEDURE — 25010000002 ONDANSETRON PER 1 MG: Performed by: STUDENT IN AN ORGANIZED HEALTH CARE EDUCATION/TRAINING PROGRAM

## 2021-10-26 PROCEDURE — 25010000002 FENTANYL CITRATE (PF) 1000 MCG/20ML SOLUTION: Performed by: STUDENT IN AN ORGANIZED HEALTH CARE EDUCATION/TRAINING PROGRAM

## 2021-10-26 PROCEDURE — 25010000002 HYDROMORPHONE PER 4 MG: Performed by: NURSE ANESTHETIST, CERTIFIED REGISTERED

## 2021-10-26 PROCEDURE — 25010000002 METHYLERGONOVINE MALEATE PER 0.2 MG: Performed by: STUDENT IN AN ORGANIZED HEALTH CARE EDUCATION/TRAINING PROGRAM

## 2021-10-26 PROCEDURE — 82803 BLOOD GASES ANY COMBINATION: CPT

## 2021-10-26 PROCEDURE — 88307 TISSUE EXAM BY PATHOLOGIST: CPT

## 2021-10-26 PROCEDURE — 25010000002 ONDANSETRON PER 1 MG: Performed by: NURSE ANESTHETIST, CERTIFIED REGISTERED

## 2021-10-26 PROCEDURE — 25010000002 FENTANYL CITRATE (PF) 250 MCG/5ML SOLUTION: Performed by: STUDENT IN AN ORGANIZED HEALTH CARE EDUCATION/TRAINING PROGRAM

## 2021-10-26 PROCEDURE — 25010000002 PHENYLEPHRINE 10 MG/ML SOLUTION: Performed by: NURSE ANESTHETIST, CERTIFIED REGISTERED

## 2021-10-26 PROCEDURE — 25010000002 METHYLERGONOVINE MALEATE PER 0.2 MG

## 2021-10-26 PROCEDURE — 85027 COMPLETE CBC AUTOMATED: CPT | Performed by: STUDENT IN AN ORGANIZED HEALTH CARE EDUCATION/TRAINING PROGRAM

## 2021-10-26 RX ORDER — ONDANSETRON 2 MG/ML
4 INJECTION INTRAMUSCULAR; INTRAVENOUS EVERY 6 HOURS PRN
Status: DISCONTINUED | OUTPATIENT
Start: 2021-10-26 | End: 2021-10-26

## 2021-10-26 RX ORDER — ONDANSETRON 2 MG/ML
4 INJECTION INTRAMUSCULAR; INTRAVENOUS ONCE AS NEEDED
Status: DISCONTINUED | OUTPATIENT
Start: 2021-10-26 | End: 2021-10-29 | Stop reason: HOSPADM

## 2021-10-26 RX ORDER — LIDOCAINE HYDROCHLORIDE AND EPINEPHRINE 20; 5 MG/ML; UG/ML
INJECTION, SOLUTION EPIDURAL; INFILTRATION; INTRACAUDAL; PERINEURAL AS NEEDED
Status: DISCONTINUED | OUTPATIENT
Start: 2021-10-26 | End: 2021-10-26 | Stop reason: SURG

## 2021-10-26 RX ORDER — ONDANSETRON 4 MG/1
4 TABLET, FILM COATED ORAL EVERY 8 HOURS PRN
Status: DISCONTINUED | OUTPATIENT
Start: 2021-10-26 | End: 2021-10-29 | Stop reason: HOSPADM

## 2021-10-26 RX ORDER — PHENYLEPHRINE HYDROCHLORIDE 10 MG/ML
INJECTION INTRAVENOUS AS NEEDED
Status: DISCONTINUED | OUTPATIENT
Start: 2021-10-26 | End: 2021-10-26 | Stop reason: SURG

## 2021-10-26 RX ORDER — ONDANSETRON 2 MG/ML
4 INJECTION INTRAMUSCULAR; INTRAVENOUS EVERY 6 HOURS PRN
Status: DISCONTINUED | OUTPATIENT
Start: 2021-10-26 | End: 2021-10-29 | Stop reason: HOSPADM

## 2021-10-26 RX ORDER — ERYTHROMYCIN 5 MG/G
OINTMENT OPHTHALMIC
Status: ACTIVE
Start: 2021-10-26 | End: 2021-10-26

## 2021-10-26 RX ORDER — METHYLERGONOVINE MALEATE 0.2 MG/1
200 TABLET ORAL ONCE
Status: DISCONTINUED | OUTPATIENT
Start: 2021-10-26 | End: 2021-10-26

## 2021-10-26 RX ORDER — ACETAMINOPHEN 500 MG
1000 TABLET ORAL ONCE
Status: COMPLETED | OUTPATIENT
Start: 2021-10-26 | End: 2021-10-26

## 2021-10-26 RX ORDER — DIPHENHYDRAMINE HCL 25 MG
25 CAPSULE ORAL EVERY 4 HOURS PRN
Status: DISCONTINUED | OUTPATIENT
Start: 2021-10-26 | End: 2021-10-29 | Stop reason: HOSPADM

## 2021-10-26 RX ORDER — FAMOTIDINE 10 MG/ML
20 INJECTION, SOLUTION INTRAVENOUS ONCE AS NEEDED
Status: DISCONTINUED | OUTPATIENT
Start: 2021-10-26 | End: 2021-10-26 | Stop reason: HOSPADM

## 2021-10-26 RX ORDER — CEFAZOLIN SODIUM 2 G/100ML
2 INJECTION, SOLUTION INTRAVENOUS ONCE
Status: COMPLETED | OUTPATIENT
Start: 2021-10-26 | End: 2021-10-26

## 2021-10-26 RX ORDER — DIPHENHYDRAMINE HYDROCHLORIDE 50 MG/ML
25 INJECTION INTRAMUSCULAR; INTRAVENOUS EVERY 4 HOURS PRN
Status: DISCONTINUED | OUTPATIENT
Start: 2021-10-26 | End: 2021-10-29 | Stop reason: HOSPADM

## 2021-10-26 RX ORDER — DOCUSATE SODIUM 100 MG/1
100 CAPSULE, LIQUID FILLED ORAL 2 TIMES DAILY PRN
Status: DISCONTINUED | OUTPATIENT
Start: 2021-10-26 | End: 2021-10-29 | Stop reason: HOSPADM

## 2021-10-26 RX ORDER — HYDROMORPHONE HYDROCHLORIDE 1 MG/ML
0.5 INJECTION, SOLUTION INTRAMUSCULAR; INTRAVENOUS; SUBCUTANEOUS
Status: DISCONTINUED | OUTPATIENT
Start: 2021-10-26 | End: 2021-10-26 | Stop reason: HOSPADM

## 2021-10-26 RX ORDER — OXYCODONE HYDROCHLORIDE AND ACETAMINOPHEN 5; 325 MG/1; MG/1
1 TABLET ORAL EVERY 4 HOURS PRN
Status: DISCONTINUED | OUTPATIENT
Start: 2021-10-26 | End: 2021-10-29 | Stop reason: HOSPADM

## 2021-10-26 RX ORDER — IBUPROFEN 800 MG/1
800 TABLET ORAL EVERY 8 HOURS SCHEDULED
Status: DISCONTINUED | OUTPATIENT
Start: 2021-10-26 | End: 2021-10-29 | Stop reason: HOSPADM

## 2021-10-26 RX ORDER — LANOLIN
1 CREAM (ML) TOPICAL
Status: DISCONTINUED | OUTPATIENT
Start: 2021-10-26 | End: 2021-10-29 | Stop reason: HOSPADM

## 2021-10-26 RX ORDER — OXYCODONE AND ACETAMINOPHEN 10; 325 MG/1; MG/1
1 TABLET ORAL EVERY 4 HOURS PRN
Status: DISCONTINUED | OUTPATIENT
Start: 2021-10-26 | End: 2021-10-29 | Stop reason: HOSPADM

## 2021-10-26 RX ORDER — MORPHINE SULFATE 1 MG/ML
INJECTION, SOLUTION EPIDURAL; INTRATHECAL; INTRAVENOUS AS NEEDED
Status: DISCONTINUED | OUTPATIENT
Start: 2021-10-26 | End: 2021-10-26 | Stop reason: SURG

## 2021-10-26 RX ORDER — HYDROMORPHONE HYDROCHLORIDE 1 MG/ML
0.5 INJECTION, SOLUTION INTRAMUSCULAR; INTRAVENOUS; SUBCUTANEOUS
Status: DISCONTINUED | OUTPATIENT
Start: 2021-10-26 | End: 2021-10-29 | Stop reason: HOSPADM

## 2021-10-26 RX ORDER — TRANEXAMIC ACID 100 MG/ML
INJECTION, SOLUTION INTRAVENOUS AS NEEDED
Status: DISCONTINUED | OUTPATIENT
Start: 2021-10-26 | End: 2021-10-26 | Stop reason: SURG

## 2021-10-26 RX ORDER — DEXMEDETOMIDINE HYDROCHLORIDE 100 UG/ML
INJECTION, SOLUTION INTRAVENOUS AS NEEDED
Status: DISCONTINUED | OUTPATIENT
Start: 2021-10-26 | End: 2021-10-26 | Stop reason: SURG

## 2021-10-26 RX ORDER — MORPHINE SULFATE 2 MG/ML
2 INJECTION, SOLUTION INTRAMUSCULAR; INTRAVENOUS
Status: ACTIVE | OUTPATIENT
Start: 2021-10-26 | End: 2021-10-27

## 2021-10-26 RX ORDER — PHYTONADIONE 1 MG/.5ML
INJECTION, EMULSION INTRAMUSCULAR; INTRAVENOUS; SUBCUTANEOUS
Status: ACTIVE
Start: 2021-10-26 | End: 2021-10-26

## 2021-10-26 RX ORDER — ONDANSETRON 2 MG/ML
INJECTION INTRAMUSCULAR; INTRAVENOUS AS NEEDED
Status: DISCONTINUED | OUTPATIENT
Start: 2021-10-26 | End: 2021-10-26 | Stop reason: SURG

## 2021-10-26 RX ORDER — NALOXONE HCL 0.4 MG/ML
0.1 VIAL (ML) INJECTION
Status: DISCONTINUED | OUTPATIENT
Start: 2021-10-26 | End: 2021-10-29 | Stop reason: HOSPADM

## 2021-10-26 RX ORDER — METHYLERGONOVINE MALEATE 0.2 MG/ML
INJECTION INTRAVENOUS
Status: COMPLETED
Start: 2021-10-26 | End: 2021-10-26

## 2021-10-26 RX ADMIN — ROPIVACAINE HYDROCHLORIDE 6 ML/HR: 2 INJECTION, SOLUTION EPIDURAL; INFILTRATION at 00:08

## 2021-10-26 RX ADMIN — CARBOPROST TROMETHAMINE 250 MCG: 250 INJECTION, SOLUTION INTRAMUSCULAR at 05:01

## 2021-10-26 RX ADMIN — DEXMEDETOMIDINE 10 MCG: 100 INJECTION, SOLUTION, CONCENTRATE INTRAVENOUS at 04:53

## 2021-10-26 RX ADMIN — IBUPROFEN 800 MG: 800 TABLET, FILM COATED ORAL at 21:59

## 2021-10-26 RX ADMIN — AZITHROMYCIN 500 MG: 500 INJECTION, POWDER, LYOPHILIZED, FOR SOLUTION INTRAVENOUS at 04:00

## 2021-10-26 RX ADMIN — SODIUM CHLORIDE, POTASSIUM CHLORIDE, SODIUM LACTATE AND CALCIUM CHLORIDE: 600; 310; 30; 20 INJECTION, SOLUTION INTRAVENOUS at 04:19

## 2021-10-26 RX ADMIN — LIDOCAINE HYDROCHLORIDE,EPINEPHRINE BITARTRATE 5 ML: 20; .005 INJECTION, SOLUTION EPIDURAL; INFILTRATION; INTRACAUDAL; PERINEURAL at 04:01

## 2021-10-26 RX ADMIN — METHYLERGONOVINE MALEATE 200 MCG: 0.2 INJECTION INTRAVENOUS at 05:16

## 2021-10-26 RX ADMIN — LIDOCAINE HYDROCHLORIDE,EPINEPHRINE BITARTRATE 5 ML: 20; .005 INJECTION, SOLUTION EPIDURAL; INFILTRATION; INTRACAUDAL; PERINEURAL at 03:53

## 2021-10-26 RX ADMIN — PHENYLEPHRINE HYDROCHLORIDE 100 MCG: 10 INJECTION INTRAVENOUS at 04:26

## 2021-10-26 RX ADMIN — HYDROMORPHONE HYDROCHLORIDE 0.5 MG: 1 INJECTION, SOLUTION INTRAMUSCULAR; INTRAVENOUS; SUBCUTANEOUS at 06:50

## 2021-10-26 RX ADMIN — MORPHINE SULFATE 3 MG: 1 INJECTION, SOLUTION EPIDURAL; INTRATHECAL; INTRAVENOUS at 05:12

## 2021-10-26 RX ADMIN — HYDROMORPHONE HYDROCHLORIDE 0.5 MG: 1 INJECTION, SOLUTION INTRAMUSCULAR; INTRAVENOUS; SUBCUTANEOUS at 06:19

## 2021-10-26 RX ADMIN — OXYCODONE AND ACETAMINOPHEN 1 TABLET: 5; 325 TABLET ORAL at 18:51

## 2021-10-26 RX ADMIN — ACETAMINOPHEN 1000 MG: 500 TABLET, FILM COATED ORAL at 04:01

## 2021-10-26 RX ADMIN — OXYTOCIN-SODIUM CHLORIDE 0.9% IV SOLN 30 UNIT/500ML 999 ML/HR: 30-0.9/5 SOLUTION at 04:46

## 2021-10-26 RX ADMIN — DOCUSATE SODIUM 100 MG: 100 CAPSULE, LIQUID FILLED ORAL at 21:59

## 2021-10-26 RX ADMIN — ONDANSETRON HYDROCHLORIDE 4 MG: 2 SOLUTION INTRAMUSCULAR; INTRAVENOUS at 04:58

## 2021-10-26 RX ADMIN — PHENYLEPHRINE HYDROCHLORIDE 100 MCG: 10 INJECTION INTRAVENOUS at 04:32

## 2021-10-26 RX ADMIN — TRANEXAMIC ACID 1000 MG: 100 INJECTION, SOLUTION INTRAVENOUS at 05:03

## 2021-10-26 RX ADMIN — METHYLERGONOVINE MALEATE 200 MCG: 0.2 INJECTION INTRAVENOUS at 04:56

## 2021-10-26 RX ADMIN — ONDANSETRON 4 MG: 2 INJECTION INTRAMUSCULAR; INTRAVENOUS at 04:00

## 2021-10-26 RX ADMIN — HYDROMORPHONE HYDROCHLORIDE 0.5 MG: 1 INJECTION, SOLUTION INTRAMUSCULAR; INTRAVENOUS; SUBCUTANEOUS at 07:33

## 2021-10-26 RX ADMIN — MORPHINE SULFATE 4 MG: 1 INJECTION, SOLUTION EPIDURAL; INTRATHECAL; INTRAVENOUS at 05:39

## 2021-10-26 RX ADMIN — PHENYLEPHRINE HYDROCHLORIDE 100 MCG: 10 INJECTION INTRAVENOUS at 05:19

## 2021-10-26 RX ADMIN — PHENYLEPHRINE HYDROCHLORIDE 100 MCG: 10 INJECTION INTRAVENOUS at 04:47

## 2021-10-26 RX ADMIN — CEFAZOLIN SODIUM 2 G: 2 INJECTION, SOLUTION INTRAVENOUS at 04:27

## 2021-10-26 RX ADMIN — LIDOCAINE HYDROCHLORIDE,EPINEPHRINE BITARTRATE 5 ML: 20; .005 INJECTION, SOLUTION EPIDURAL; INFILTRATION; INTRACAUDAL; PERINEURAL at 04:22

## 2021-10-26 RX ADMIN — SODIUM CHLORIDE, POTASSIUM CHLORIDE, SODIUM LACTATE AND CALCIUM CHLORIDE 125 ML/HR: 600; 310; 30; 20 INJECTION, SOLUTION INTRAVENOUS at 03:30

## 2021-10-26 RX ADMIN — DEXMEDETOMIDINE 5 MCG: 100 INJECTION, SOLUTION, CONCENTRATE INTRAVENOUS at 05:13

## 2021-10-26 RX ADMIN — MISOPROSTOL 800 MCG: 200 TABLET ORAL at 05:42

## 2021-10-26 RX ADMIN — MORPHINE SULFATE 3 MG: 1 INJECTION, SOLUTION EPIDURAL; INTRATHECAL; INTRAVENOUS at 04:49

## 2021-10-26 RX ADMIN — DEXMEDETOMIDINE 10 MCG: 100 INJECTION, SOLUTION, CONCENTRATE INTRAVENOUS at 04:48

## 2021-10-26 RX ADMIN — LIDOCAINE HYDROCHLORIDE,EPINEPHRINE BITARTRATE 5 ML: 20; .005 INJECTION, SOLUTION EPIDURAL; INFILTRATION; INTRACAUDAL; PERINEURAL at 04:13

## 2021-10-26 RX ADMIN — AZITHROMYCIN 500 MG: 500 INJECTION, POWDER, LYOPHILIZED, FOR SOLUTION INTRAVENOUS at 04:29

## 2021-10-26 NOTE — ANESTHESIA POSTPROCEDURE EVALUATION
"Patient: Beata Damico    Procedure Summary     Date: 10/25/21 Room / Location:  [facility]  /  [facility]    Anesthesia Start: 2 Anesthesia Stop: 10/26/21 0552    Procedure:  SECTION PRIMARY (N/A Abdomen) Diagnosis: (FAILURE TO PROGRESS/ARREST OF DESCENT)    Surgeons: Paz Ortega MD Provider: Quinn Handley MD    Anesthesia Type: epidural ASA Status: 2          Anesthesia Type: epidural    Vitals  Vitals Value Taken Time   /81 10/26/21 0630   Temp 36.3 °C (97.4 °F) 10/26/21 0552   Pulse 88 10/26/21 0634   Resp 17 10/26/21 0552   SpO2 100 % 10/26/21 0634   Vitals shown include unvalidated device data.        Post Anesthesia Care and Evaluation    Patient location during evaluation: bedside  Patient participation: complete - patient participated  Level of consciousness: awake and alert  Pain management: adequate  Airway patency: patent  Anesthetic complications: No anesthetic complications  PONV Status: controlled  Cardiovascular status: acceptable and hemodynamically stable  Respiratory status: acceptable and spontaneous ventilation  Hydration status: acceptable    Comments: /81   Pulse 90   Temp 36.3 °C (97.4 °F) (Oral)   Resp 17   Ht 156.2 cm (61.5\")   LMP 2021   SpO2 100%   Breastfeeding Yes   BMI 28.44 kg/m²         "

## 2021-10-27 LAB
BASOPHILS # BLD MANUAL: 0.17 10*3/MM3 (ref 0–0.2)
BASOPHILS NFR BLD AUTO: 1 % (ref 0–1.5)
DEPRECATED RDW RBC AUTO: 50.5 FL (ref 37–54)
ERYTHROCYTE [DISTWIDTH] IN BLOOD BY AUTOMATED COUNT: 14.8 % (ref 12.3–15.4)
HCT VFR BLD AUTO: 23.9 % (ref 34–46.6)
HCT VFR BLD AUTO: 26.5 % (ref 34–46.6)
HGB BLD-MCNC: 8.1 G/DL (ref 12–15.9)
HGB BLD-MCNC: 8.5 G/DL (ref 12–15.9)
LYMPHOCYTES # BLD MANUAL: 0.85 10*3/MM3 (ref 0.7–3.1)
LYMPHOCYTES NFR BLD MANUAL: 3 % (ref 5–12)
LYMPHOCYTES NFR BLD MANUAL: 5 % (ref 19.6–45.3)
MCH RBC QN AUTO: 31.2 PG (ref 26.6–33)
MCHC RBC AUTO-ENTMCNC: 33.9 G/DL (ref 31.5–35.7)
MCV RBC AUTO: 91.9 FL (ref 79–97)
MONOCYTES # BLD AUTO: 0.51 10*3/MM3 (ref 0.1–0.9)
NEUTROPHILS # BLD AUTO: 15.45 10*3/MM3 (ref 1.7–7)
NEUTROPHILS NFR BLD MANUAL: 91 % (ref 42.7–76)
PLAT MORPH BLD: NORMAL
PLATELET # BLD AUTO: 167 10*3/MM3 (ref 140–450)
PMV BLD AUTO: 10.3 FL (ref 6–12)
RBC # BLD AUTO: 2.6 10*6/MM3 (ref 3.77–5.28)
RBC MORPH BLD: NORMAL
WBC # BLD AUTO: 16.98 10*3/MM3 (ref 3.4–10.8)
WBC MORPH BLD: NORMAL

## 2021-10-27 PROCEDURE — 85007 BL SMEAR W/DIFF WBC COUNT: CPT | Performed by: STUDENT IN AN ORGANIZED HEALTH CARE EDUCATION/TRAINING PROGRAM

## 2021-10-27 PROCEDURE — 0503F POSTPARTUM CARE VISIT: CPT | Performed by: OBSTETRICS & GYNECOLOGY

## 2021-10-27 PROCEDURE — 85014 HEMATOCRIT: CPT | Performed by: OBSTETRICS & GYNECOLOGY

## 2021-10-27 PROCEDURE — 25010000002 ENOXAPARIN PER 10 MG: Performed by: STUDENT IN AN ORGANIZED HEALTH CARE EDUCATION/TRAINING PROGRAM

## 2021-10-27 PROCEDURE — 85025 COMPLETE CBC W/AUTO DIFF WBC: CPT | Performed by: STUDENT IN AN ORGANIZED HEALTH CARE EDUCATION/TRAINING PROGRAM

## 2021-10-27 PROCEDURE — 85018 HEMOGLOBIN: CPT | Performed by: OBSTETRICS & GYNECOLOGY

## 2021-10-27 RX ORDER — NALOXONE HCL 0.4 MG/ML
0.2 VIAL (ML) INJECTION
Status: DISCONTINUED | OUTPATIENT
Start: 2021-10-27 | End: 2021-10-29 | Stop reason: HOSPADM

## 2021-10-27 RX ADMIN — OXYCODONE AND ACETAMINOPHEN 1 TABLET: 325; 10 TABLET ORAL at 22:29

## 2021-10-27 RX ADMIN — IBUPROFEN 800 MG: 800 TABLET, FILM COATED ORAL at 06:02

## 2021-10-27 RX ADMIN — OXYCODONE AND ACETAMINOPHEN 1 TABLET: 5; 325 TABLET ORAL at 14:23

## 2021-10-27 RX ADMIN — OXYCODONE AND ACETAMINOPHEN 1 TABLET: 325; 10 TABLET ORAL at 18:26

## 2021-10-27 RX ADMIN — IBUPROFEN 800 MG: 800 TABLET, FILM COATED ORAL at 22:29

## 2021-10-27 RX ADMIN — IBUPROFEN 800 MG: 800 TABLET, FILM COATED ORAL at 14:23

## 2021-10-27 RX ADMIN — ENOXAPARIN SODIUM 40 MG: 40 INJECTION SUBCUTANEOUS at 06:01

## 2021-10-28 PROCEDURE — 63710000001 ONDANSETRON PER 8 MG: Performed by: STUDENT IN AN ORGANIZED HEALTH CARE EDUCATION/TRAINING PROGRAM

## 2021-10-28 PROCEDURE — 25010000002 ENOXAPARIN PER 10 MG: Performed by: STUDENT IN AN ORGANIZED HEALTH CARE EDUCATION/TRAINING PROGRAM

## 2021-10-28 PROCEDURE — 0503F POSTPARTUM CARE VISIT: CPT | Performed by: OBSTETRICS & GYNECOLOGY

## 2021-10-28 RX ORDER — SIMETHICONE 80 MG
80 TABLET,CHEWABLE ORAL 4 TIMES DAILY PRN
Status: DISCONTINUED | OUTPATIENT
Start: 2021-10-28 | End: 2021-10-29 | Stop reason: HOSPADM

## 2021-10-28 RX ORDER — FERROUS SULFATE 325(65) MG
325 TABLET ORAL
Status: DISCONTINUED | OUTPATIENT
Start: 2021-10-28 | End: 2021-10-29 | Stop reason: HOSPADM

## 2021-10-28 RX ADMIN — IBUPROFEN 800 MG: 800 TABLET, FILM COATED ORAL at 14:18

## 2021-10-28 RX ADMIN — OXYCODONE AND ACETAMINOPHEN 1 TABLET: 325; 10 TABLET ORAL at 10:54

## 2021-10-28 RX ADMIN — OXYCODONE AND ACETAMINOPHEN 1 TABLET: 325; 10 TABLET ORAL at 15:23

## 2021-10-28 RX ADMIN — OXYCODONE AND ACETAMINOPHEN 1 TABLET: 325; 10 TABLET ORAL at 20:29

## 2021-10-28 RX ADMIN — IBUPROFEN 800 MG: 800 TABLET, FILM COATED ORAL at 06:35

## 2021-10-28 RX ADMIN — SIMETHICONE 80 MG: 80 TABLET, CHEWABLE ORAL at 20:28

## 2021-10-28 RX ADMIN — ONDANSETRON HYDROCHLORIDE 4 MG: 4 TABLET, FILM COATED ORAL at 14:18

## 2021-10-28 RX ADMIN — FERROUS SULFATE TAB 325 MG (65 MG ELEMENTAL FE) 325 MG: 325 (65 FE) TAB at 10:54

## 2021-10-28 RX ADMIN — DOCUSATE SODIUM 100 MG: 100 CAPSULE, LIQUID FILLED ORAL at 08:58

## 2021-10-28 RX ADMIN — ENOXAPARIN SODIUM 40 MG: 40 INJECTION SUBCUTANEOUS at 05:08

## 2021-10-28 RX ADMIN — OXYCODONE AND ACETAMINOPHEN 1 TABLET: 325; 10 TABLET ORAL at 06:35

## 2021-10-28 RX ADMIN — DOCUSATE SODIUM 100 MG: 100 CAPSULE, LIQUID FILLED ORAL at 20:28

## 2021-10-28 RX ADMIN — IBUPROFEN 800 MG: 800 TABLET, FILM COATED ORAL at 22:00

## 2021-10-29 VITALS
DIASTOLIC BLOOD PRESSURE: 72 MMHG | BODY MASS INDEX: 28.44 KG/M2 | TEMPERATURE: 97.9 F | HEIGHT: 62 IN | HEART RATE: 83 BPM | RESPIRATION RATE: 17 BRPM | SYSTOLIC BLOOD PRESSURE: 122 MMHG | OXYGEN SATURATION: 95 %

## 2021-10-29 PROCEDURE — 0503F POSTPARTUM CARE VISIT: CPT | Performed by: NURSE PRACTITIONER

## 2021-10-29 PROCEDURE — 25010000002 ENOXAPARIN PER 10 MG: Performed by: STUDENT IN AN ORGANIZED HEALTH CARE EDUCATION/TRAINING PROGRAM

## 2021-10-29 RX ORDER — PSEUDOEPHEDRINE HCL 30 MG
100 TABLET ORAL 2 TIMES DAILY
Qty: 60 CAPSULE | Refills: 0 | Status: SHIPPED | OUTPATIENT
Start: 2021-10-29 | End: 2021-12-14

## 2021-10-29 RX ORDER — OXYCODONE HYDROCHLORIDE AND ACETAMINOPHEN 5; 325 MG/1; MG/1
TABLET ORAL
Qty: 24 TABLET | Refills: 0 | Status: SHIPPED | OUTPATIENT
Start: 2021-10-29 | End: 2021-12-14

## 2021-10-29 RX ORDER — IBUPROFEN 800 MG/1
800 TABLET ORAL EVERY 8 HOURS PRN
Qty: 90 TABLET | Refills: 1 | Status: SHIPPED | OUTPATIENT
Start: 2021-10-29 | End: 2021-12-14

## 2021-10-29 RX ADMIN — FERROUS SULFATE TAB 325 MG (65 MG ELEMENTAL FE) 325 MG: 325 (65 FE) TAB at 09:19

## 2021-10-29 RX ADMIN — OXYCODONE AND ACETAMINOPHEN 1 TABLET: 325; 10 TABLET ORAL at 06:08

## 2021-10-29 RX ADMIN — OXYCODONE AND ACETAMINOPHEN 1 TABLET: 325; 10 TABLET ORAL at 10:30

## 2021-10-29 RX ADMIN — SIMETHICONE 80 MG: 80 TABLET, CHEWABLE ORAL at 02:18

## 2021-10-29 RX ADMIN — DOCUSATE SODIUM 100 MG: 100 CAPSULE, LIQUID FILLED ORAL at 09:19

## 2021-10-29 RX ADMIN — ENOXAPARIN SODIUM 40 MG: 40 INJECTION SUBCUTANEOUS at 05:08

## 2021-10-29 RX ADMIN — IBUPROFEN 800 MG: 800 TABLET, FILM COATED ORAL at 06:08

## 2021-11-02 RX ORDER — OXYCODONE HYDROCHLORIDE AND ACETAMINOPHEN 5; 325 MG/1; MG/1
1 TABLET ORAL EVERY 6 HOURS PRN
Qty: 10 TABLET | Refills: 0 | Status: SHIPPED | OUTPATIENT
Start: 2021-11-02 | End: 2021-12-14

## 2021-11-16 ENCOUNTER — OFFICE VISIT (OUTPATIENT)
Dept: OBSTETRICS AND GYNECOLOGY | Facility: CLINIC | Age: 26
End: 2021-11-16

## 2021-11-16 VITALS
DIASTOLIC BLOOD PRESSURE: 70 MMHG | WEIGHT: 122 LBS | BODY MASS INDEX: 23.03 KG/M2 | SYSTOLIC BLOOD PRESSURE: 114 MMHG | HEIGHT: 61 IN

## 2021-11-16 DIAGNOSIS — Z98.891 S/P CESAREAN SECTION: ICD-10-CM

## 2021-11-16 PROCEDURE — 0503F POSTPARTUM CARE VISIT: CPT | Performed by: STUDENT IN AN ORGANIZED HEALTH CARE EDUCATION/TRAINING PROGRAM

## 2021-11-16 NOTE — PROGRESS NOTES
"Chief Complaint   Patient presents with   • Post-op Follow-up     c/s check        SUBJECTIVE:   Beata Damico is a 26 y.o.  who presents s/p  Primary Low Transverse  Section on 10/26/21 for failure to progress and chorioamnionitis. She reports doing well. Reports normal bowel and bladder function. She is still having vaginal spotting. She scored a 3 on the postpartum depression scale and reports her mood has been good. She is breast feeding. Her pain is well controlled on PO medications as needed.     Past Medical History:   Diagnosis Date   • Abnormal Pap smear of cervix    • Urinary tract infection      Past Surgical History:   Procedure Laterality Date   •  SECTION N/A 10/26/2021    Procedure:  SECTION PRIMARY;  Surgeon: Paz Ortega MD;  Location: [facility] LAB[facility] DELIVERY;  Service: Obstetrics/Gynecology;  Laterality: N/A;     Social History     Tobacco Use   • Smoking status: Never Smoker   • Smokeless tobacco: Never Used   Vaping Use   • Vaping Use: Never used   Substance Use Topics   • Alcohol use: Not Currently   • Drug use: Not Currently     History reviewed. No pertinent family history.    Patient Active Problem List   Diagnosis   • Maternal varicella, non-immune   • S/P  section   • S/P         OBJECTIVE:   /70   Ht 156.2 cm (61.5\")   Wt 55.3 kg (122 lb)   LMP 2021   BMI 22.68 kg/m²    Physical Examination:   General appearance - alert, well appearing, and in no distress  Chest - no tachypnea, retractions or cyanosis  Heart - normal rate and regular rhythm  Abdomen - soft, nontender, nondistended, no masses or organomegaly; Incision healing well, no drainage, no erythema, no hernia  Pelvic - deferred  Neurological - screening mental status exam normal  Musculoskeletal - no joint tenderness, deformity or swelling  Psychiatric - normal mood and affect    Lab Results   Component Value Date    WBC 16.98 (H) 10/27/2021    HGB 8.5 (L) " 10/27/2021    HCT 26.5 (L) 10/27/2021    MCV 91.9 10/27/2021     10/27/2021        ASSESSMENT:   1. S/p primary  section     PLAN:   Doing well postpartum  Baby is doing well.   Contraception -TBD  At this time, continue pelvic rest and lifting precautions.  Reviewed last pap smear -3/2021- NILM  Return in about 3 weeks (around 2021) for postpartum visit .    Paz Ortega MD

## 2021-12-14 ENCOUNTER — OFFICE VISIT (OUTPATIENT)
Dept: OBSTETRICS AND GYNECOLOGY | Facility: CLINIC | Age: 26
End: 2021-12-14

## 2021-12-14 VITALS
BODY MASS INDEX: 22.66 KG/M2 | HEIGHT: 61 IN | WEIGHT: 120 LBS | SYSTOLIC BLOOD PRESSURE: 104 MMHG | DIASTOLIC BLOOD PRESSURE: 73 MMHG

## 2021-12-14 DIAGNOSIS — Z98.891 S/P CESAREAN SECTION: ICD-10-CM

## 2021-12-14 PROCEDURE — 0503F POSTPARTUM CARE VISIT: CPT | Performed by: STUDENT IN AN ORGANIZED HEALTH CARE EDUCATION/TRAINING PROGRAM

## 2021-12-14 NOTE — PROGRESS NOTES
"Chief Complaint   Patient presents with   • Postpartum Follow-up     breast and bottle feeding   female 9lb 4oz  and pap  normal        SUBJECTIVE:   Beata Damico is a 26 y.o.  who presents s/p  Primary Low Transverse  Section on 10/26/21. She reports that she is doing well and had her first regular period on Saturday and is now spotting today. Bowel and bladder function have returned to normal. She is breast feeding. She denies mood changes and scored a 0 on postpartum depression screening scale. She reports her incision is doing well.     Past Medical History:   Diagnosis Date   • Abnormal Pap smear of cervix    • Urinary tract infection      Past Surgical History:   Procedure Laterality Date   •  SECTION N/A 10/26/2021    Procedure:  SECTION PRIMARY;  Surgeon: Paz Ortega MD;  Location: [facility] LABOR DELIVERY;  Service: Obstetrics/Gynecology;  Laterality: N/A;     Social History     Tobacco Use   • Smoking status: Never Smoker   • Smokeless tobacco: Never Used   Vaping Use   • Vaping Use: Never used   Substance Use Topics   • Alcohol use: Not Currently   • Drug use: Not Currently     No family history on file.    Patient Active Problem List   Diagnosis   • Maternal varicella, non-immune   • S/P  section   • S/P         OBJECTIVE:   /73   Ht 156.2 cm (61.5\")   Wt 54.4 kg (120 lb)   LMP 2021   BMI 22.31 kg/m²    Physical Examination:   General appearance - alert, well appearing, and in no distress  Breasts - Examined in supine position  Symmetric without masses or skin dimpling  Nipples normal without inversion, lesions or discharge  There are no palpable axillary nodes  Chest - no tachypnea, retractions or cyanosis  Heart - normal rate and regular rhythm  Abdomen - soft, nontender, nondistended, no masses or organomegaly; Incision healing well, no drainage, no erythema, no hernia  Pelvic - normal external female genitalia, normal " vulva, normal vaginal mucosa, normal appearing cervix, normal sized, non-tender uterus. No adnexal masses or tenderness.   Neurological - screening mental status exam normal  Musculoskeletal - no joint tenderness, deformity or swelling  Psychiatric - normal mood and affect    Lab Results   Component Value Date    WBC 16.98 (H) 10/27/2021    HGB 8.5 (L) 10/27/2021    HCT 26.5 (L) 10/27/2021    MCV 91.9 10/27/2021     10/27/2021        ASSESSMENT:   S/p primary low transverse  section     PLAN:   Doing well postpartum with well healed  incision.   Baby is doing well.   Contraception - Patient plans to use condoms for contraception. I advised pregnancy spacing of 2 years in setting of  section scar.   Patient may return to normal activity including sexual activity and exercise.   Reviewed last pap smear - 3/2021- NILM  Return in about 1 year (around 2022) for Annual physical.    Paz Ortega MD

## (undated) DEVICE — SUT VIC 3/0 CTI 36IN J944H

## (undated) DEVICE — KENDALL SCD EXPRESS SLEEVES, KNEE LENGTH, MEDIUM: Brand: KENDALL SCD

## (undated) DEVICE — SUT VIC 0 CT 36IN J958H

## (undated) DEVICE — SUT GUT CHRM 0 CT 27IN 914H

## (undated) DEVICE — SUT VIC 0 CTX 36IN J978H

## (undated) DEVICE — ANTIBACTERIAL UNDYED BRAIDED (POLYGLACTIN 910), SYNTHETIC ABSORBABLE SUTURE: Brand: COATED VICRYL

## (undated) DEVICE — GLV SURG SENSICARE MICRO PF LF 6 STRL

## (undated) DEVICE — SOL IRR H2O BTL 1000ML STRL

## (undated) DEVICE — SUT MNCRYL PLS ANTIB UD 4/0 PS2 18IN

## (undated) DEVICE — 3M(TM) TEGADERM(TM) TRANSPARENT FILM DRESSING FRAME STYLE 1627: Brand: 3M™ TEGADERM™

## (undated) DEVICE — GLV SURG SENSICARE MICRO PF LF 6.5 STRL

## (undated) DEVICE — SUT MNCRYL 0/0 CTX 36IN Y398H